# Patient Record
Sex: MALE | Race: WHITE
[De-identification: names, ages, dates, MRNs, and addresses within clinical notes are randomized per-mention and may not be internally consistent; named-entity substitution may affect disease eponyms.]

---

## 2017-09-21 ENCOUNTER — HOSPITAL ENCOUNTER (EMERGENCY)
Dept: HOSPITAL 17 - NEPK | Age: 42
Discharge: HOME | End: 2017-09-21
Payer: MEDICAID

## 2017-09-21 VITALS
TEMPERATURE: 98.2 F | OXYGEN SATURATION: 98 % | HEART RATE: 84 BPM | SYSTOLIC BLOOD PRESSURE: 154 MMHG | RESPIRATION RATE: 15 BRPM | DIASTOLIC BLOOD PRESSURE: 74 MMHG

## 2017-09-21 VITALS — HEIGHT: 69 IN | BODY MASS INDEX: 27.76 KG/M2 | WEIGHT: 187.39 LBS

## 2017-09-21 VITALS — DIASTOLIC BLOOD PRESSURE: 78 MMHG | SYSTOLIC BLOOD PRESSURE: 141 MMHG

## 2017-09-21 DIAGNOSIS — F41.9: ICD-10-CM

## 2017-09-21 DIAGNOSIS — S90.31XA: Primary | ICD-10-CM

## 2017-09-21 DIAGNOSIS — K85.90: ICD-10-CM

## 2017-09-21 DIAGNOSIS — E11.9: ICD-10-CM

## 2017-09-21 DIAGNOSIS — R48.0: ICD-10-CM

## 2017-09-21 DIAGNOSIS — F31.9: ICD-10-CM
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
## 2017-09-21 DIAGNOSIS — J45.909: ICD-10-CM

## 2017-09-21 DIAGNOSIS — K21.9: ICD-10-CM

## 2017-09-21 PROCEDURE — 73630 X-RAY EXAM OF FOOT: CPT

## 2017-09-21 PROCEDURE — 99283 EMERGENCY DEPT VISIT LOW MDM: CPT
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
## 2018-01-14 ENCOUNTER — HOSPITAL ENCOUNTER (INPATIENT)
Dept: HOSPITAL 17 - NEPE | Age: 43
LOS: 3 days | Discharge: HOME | DRG: 638 | End: 2018-01-17
Attending: HOSPITALIST | Admitting: HOSPITALIST
Payer: MEDICAID

## 2018-01-14 VITALS
DIASTOLIC BLOOD PRESSURE: 92 MMHG | OXYGEN SATURATION: 99 % | RESPIRATION RATE: 20 BRPM | HEART RATE: 123 BPM | SYSTOLIC BLOOD PRESSURE: 143 MMHG

## 2018-01-14 VITALS — WEIGHT: 187.39 LBS | BODY MASS INDEX: 27.76 KG/M2 | HEIGHT: 69 IN

## 2018-01-14 VITALS
TEMPERATURE: 98 F | RESPIRATION RATE: 20 BRPM | OXYGEN SATURATION: 98 % | SYSTOLIC BLOOD PRESSURE: 114 MMHG | HEART RATE: 139 BPM | DIASTOLIC BLOOD PRESSURE: 88 MMHG

## 2018-01-14 VITALS — OXYGEN SATURATION: 96 %

## 2018-01-14 DIAGNOSIS — K92.0: ICD-10-CM

## 2018-01-14 DIAGNOSIS — Z88.8: ICD-10-CM

## 2018-01-14 DIAGNOSIS — E86.0: ICD-10-CM

## 2018-01-14 DIAGNOSIS — E10.10: Primary | ICD-10-CM

## 2018-01-14 DIAGNOSIS — Z88.0: ICD-10-CM

## 2018-01-14 DIAGNOSIS — D72.829: ICD-10-CM

## 2018-01-14 DIAGNOSIS — K21.9: ICD-10-CM

## 2018-01-14 DIAGNOSIS — F31.9: ICD-10-CM

## 2018-01-14 DIAGNOSIS — E87.6: ICD-10-CM

## 2018-01-14 DIAGNOSIS — G40.909: ICD-10-CM

## 2018-01-14 DIAGNOSIS — N17.9: ICD-10-CM

## 2018-01-14 DIAGNOSIS — Z79.4: ICD-10-CM

## 2018-01-14 LAB
ALBUMIN SERPL-MCNC: 4.7 GM/DL (ref 3.4–5)
ALP SERPL-CCNC: 138 U/L (ref 45–117)
ALT SERPL-CCNC: 20 U/L (ref 12–78)
AST SERPL-CCNC: 10 U/L (ref 15–37)
BASOPHILS # BLD AUTO: 0.1 TH/MM3 (ref 0–0.2)
BASOPHILS NFR BLD: 0.5 % (ref 0–2)
BILIRUB SERPL-MCNC: 0.7 MG/DL (ref 0.2–1)
BUN SERPL-MCNC: 32 MG/DL (ref 7–18)
CALCIUM SERPL-MCNC: 10 MG/DL (ref 8.5–10.1)
CHLORIDE SERPL-SCNC: 86 MEQ/L (ref 98–107)
CREAT SERPL-MCNC: 2.43 MG/DL (ref 0.6–1.3)
EOSINOPHIL # BLD: 0 TH/MM3 (ref 0–0.4)
EOSINOPHIL NFR BLD: 0 % (ref 0–4)
ERYTHROCYTE [DISTWIDTH] IN BLOOD BY AUTOMATED COUNT: 14.2 % (ref 11.6–17.2)
GFR SERPLBLD BASED ON 1.73 SQ M-ARVRAT: 30 ML/MIN (ref 89–?)
GLUCOSE SERPL-MCNC: 562 MG/DL (ref 74–106)
HCO3 BLD-SCNC: 10.8 MEQ/L (ref 21–32)
HCT VFR BLD CALC: 56.4 % (ref 39–51)
HGB BLD-MCNC: 18.1 GM/DL (ref 13–17)
INR PPP: 1 RATIO
LIPASE: 115 U/L (ref 73–393)
LYMPHOCYTES # BLD AUTO: 1.2 TH/MM3 (ref 1–4.8)
LYMPHOCYTES NFR BLD AUTO: 5.4 % (ref 9–44)
MAGNESIUM SERPL-MCNC: 2.5 MG/DL (ref 1.5–2.5)
MCH RBC QN AUTO: 29.6 PG (ref 27–34)
MCHC RBC AUTO-ENTMCNC: 32 % (ref 32–36)
MCV RBC AUTO: 92.4 FL (ref 80–100)
MONOCYTE #: 0.8 TH/MM3 (ref 0–0.9)
MONOCYTES NFR BLD: 3.7 % (ref 0–8)
NEUTROPHILS # BLD AUTO: 19.8 TH/MM3 (ref 1.8–7.7)
NEUTROPHILS NFR BLD AUTO: 90.4 % (ref 16–70)
PHOSPHATE SERPL-MCNC: 8.9 MG/DL (ref 2.5–4.9)
PLATELET # BLD: 366 TH/MM3 (ref 150–450)
PMV BLD AUTO: 8.4 FL (ref 7–11)
PROT SERPL-MCNC: 9 GM/DL (ref 6.4–8.2)
PROTHROMBIN TIME: 10.3 SEC (ref 9.8–11.6)
RBC # BLD AUTO: 6.11 MIL/MM3 (ref 4.5–5.9)
SODIUM SERPL-SCNC: 128 MEQ/L (ref 136–145)
WBC # BLD AUTO: 21.9 TH/MM3 (ref 4–11)

## 2018-01-14 PROCEDURE — 74176 CT ABD & PELVIS W/O CONTRAST: CPT

## 2018-01-14 PROCEDURE — 82805 BLOOD GASES W/O2 SATURATION: CPT

## 2018-01-14 PROCEDURE — 83735 ASSAY OF MAGNESIUM: CPT

## 2018-01-14 PROCEDURE — 80053 COMPREHEN METABOLIC PANEL: CPT

## 2018-01-14 PROCEDURE — 71045 X-RAY EXAM CHEST 1 VIEW: CPT

## 2018-01-14 PROCEDURE — 87641 MR-STAPH DNA AMP PROBE: CPT

## 2018-01-14 PROCEDURE — 82010 KETONE BODYS QUAN: CPT

## 2018-01-14 PROCEDURE — 83690 ASSAY OF LIPASE: CPT

## 2018-01-14 PROCEDURE — 84100 ASSAY OF PHOSPHORUS: CPT

## 2018-01-14 PROCEDURE — 85730 THROMBOPLASTIN TIME PARTIAL: CPT

## 2018-01-14 PROCEDURE — 85025 COMPLETE CBC W/AUTO DIFF WBC: CPT

## 2018-01-14 PROCEDURE — C9113 INJ PANTOPRAZOLE SODIUM, VIA: HCPCS

## 2018-01-14 PROCEDURE — 81001 URINALYSIS AUTO W/SCOPE: CPT

## 2018-01-14 PROCEDURE — 96375 TX/PRO/DX INJ NEW DRUG ADDON: CPT

## 2018-01-14 PROCEDURE — 96361 HYDRATE IV INFUSION ADD-ON: CPT

## 2018-01-14 PROCEDURE — 82948 REAGENT STRIP/BLOOD GLUCOSE: CPT

## 2018-01-14 PROCEDURE — 83605 ASSAY OF LACTIC ACID: CPT

## 2018-01-14 PROCEDURE — 83036 HEMOGLOBIN GLYCOSYLATED A1C: CPT

## 2018-01-14 PROCEDURE — 83930 ASSAY OF BLOOD OSMOLALITY: CPT

## 2018-01-14 PROCEDURE — 85610 PROTHROMBIN TIME: CPT

## 2018-01-14 PROCEDURE — 80048 BASIC METABOLIC PNL TOTAL CA: CPT

## 2018-01-14 PROCEDURE — 80202 ASSAY OF VANCOMYCIN: CPT

## 2018-01-14 PROCEDURE — 96374 THER/PROPH/DIAG INJ IV PUSH: CPT

## 2018-01-14 RX ADMIN — PHENYTOIN SODIUM SCH MLS/HR: 50 INJECTION INTRAMUSCULAR; INTRAVENOUS at 21:36

## 2018-01-14 RX ADMIN — SODIUM BICARBONATE SCH MLS/HR: 84 INJECTION, SOLUTION INTRAVENOUS at 21:54

## 2018-01-14 NOTE — RADRPT
EXAM DATE/TIME:  01/14/2018 22:31 

 

[facility] COMPARISON:     

CT ABDOMEN & PELVIS W/O CONTRAST, September 21, 2015, 8:04.

 

 

INDICATIONS :     

Abdomen pain.

                  

 

ORAL CONTRAST:      

No oral contrast ingested.

                  

 

RADIATION DOSE:     

9.96 CTDIvol (mGy) 

 

 

MEDICAL HISTORY :     

Seizures. Pancreatitis. Diabetes mellitus type 2.

 

SURGICAL HISTORY :      

None. 

 

ENCOUNTER:      

Initial

 

ACUITY:      

1 day

 

PAIN SCALE:      

8/10

 

LOCATION:       

Bilateral  abdomen

 

TECHNIQUE:     

Volumetric scanning of the abdomen and pelvis was performed.  Using automated exposure control and ad
justment of the mA and/or kV according to patient size, radiation dose was kept as low as reasonably 
achievable to obtain optimal diagnostic quality images.  DICOM format image data is available electro
nically for review and comparison.  

 

FINDINGS:     

 

LOWER LUNGS:     

The visualized lower lungs are clear.

 

LIVER:     

There is mild decreased density to the liver. No focal hepatic lesion is seen.  There is no dilation 
of the biliary tree.  No calcified gallstones.

 

SPLEEN:     

Normal size without lesion.

 

PANCREAS:     

Within normal limits. 

 

KIDNEYS:     

Normal in size and shape.  There is no mass, stone, or hydronephrosis.

 

ADRENAL GLANDS:     

Within normal limits.

 

VASCULAR:     

There is no aortic aneurysm.

 

BOWEL/MESENTERY:     

The stomach, small bowel, and colon demonstrate no acute abnormality.  There is no free intraperitone
al air or fluid. The appendix is normal.

 

ABDOMINAL WALL:     

Within normal limits.

 

RETROPERITONEUM:     

There is no lymphadenopathy.

 

BLADDER:     

No wall thickening or mass.

 

REPRODUCTIVE:     

Within normal limits.

 

INGUINAL:     

There is no lymphadenopathy or hernia.

 

MUSCULOSKELETAL:     

Within normal limits for patient age.

 

CONCLUSION:     

1. No acute abnormality seen.

2. Minimal hepatic steatosis. 

 

 

 

 Luis Antonio Garcia MD on January 14, 2018 at 23:17           

Board Certified Radiologist.

 This report was verified electronically.

## 2018-01-14 NOTE — HHI.HP
__________________________________________________





HPI


Service


[facility]


Primary Care Physician


Feliz Farley D.O.


Admission Diagnosis





DKA/Hematemesis/Dehydration


Diagnoses:  


(1) DKA (diabetic ketoacidoses)


Diagnosis:  Principal





(2) Lactic acidosis


Diagnosis:  Principal





(3) Leukocytosis


Diagnosis:  Principal





(4) Hematemesis


Diagnosis:  Principal





Travel History


International Travel<30 Days:  No


Contact w/Intl Traveler <30 Da:  No


Traveled to Known Affected Are:  No


History of Present Illness


This is a 42-year-old male with a PMH of DM, Anxiety, Depression, Bipolar 

Disorder, Seizure Disorder, Non-Compliance, h/o Pancreatitis and GERD who 

presented to the ER w/ complaints of abdominal pain, nausea/vomiting and 

hematemesis x4-5 days.  Reports generalized abdominal pain, intermittent, 

cramping, severe 8/10, non-radiating, associated w/ nausea/vomiting.  Today, 

noted episode of bloody emesis.  Denies fever, chills, diarrhea or sick 

contacts.  Does not check BS regularly, non-compliant w/ medications.  On 

arrival, /88, , O2 sat 98% on RA, Afebrile.  WBC 21.9.  Creatinine 

2.43, previously 0.91 on 11/6/15.  CO2 10.8.  , Lactic Acid 8.8.  B-

hydroxy 9.37.  ABG w/ pH 7.14.  Started on DKA Protocol.  CT Abd/Pelvis w/ no 

acute findings.





Review of Systems


Except as stated in HPI:  all other systems reviewed are Neg


ROS: 14 point review of systems otherwise negative.





Past Family Social History


Past Medical History


PMH:  DM, Anxiety, Depression, Bipolar Disorder, Seizure Disorder, Non-

Compliance, h/o Pancreatitis and GERD


Past Surgical History


PAST SURGICAL HISTORY:  None


Allergies:  


Coded Allergies:  


     Pork/Porcine Containing Products (Verified  Allergy, Severe, ALLERGIC TO 

PORK INSULIN ONLY, 18)


     insulin aspart (Verified  Allergy, Severe, ITCHING, RASH TO PORK INSULIN 

ONLY, 18)


     insulin aspart protamine human (Verified  Allergy, Severe, ITCHING, RASH 

TO PORK INSULIN ONLY, 18)


     insulin detemir (Verified  Allergy, Severe, ITCHING, RASH TO PORK INSULIN 

ONLY, 18)


     insulin glargine (Verified  Allergy, Severe, ITCHING, RASH TO PORK INSULIN 

ONLY, 18)


     insulin isophane (NPH) (Verified  Allergy, Severe, ITCHING, RASH TO PORK 

INSULIN ONLY, 18)


     insulin lispro (Verified  Allergy, Severe, ITCHING, RASH TO PORK INSULIN 

ONLY, 18)


     insulin regular (Verified  Allergy, Severe, ITCHING, RASH TO PORK INSULIN 

ONLY, 18)


     penicillin G (Verified  Allergy, Severe, HIVES, 18)


Family History


PAST FAMILY HISTORY:  Reviewed, positive for DM.


Social History


PAST SOCIAL HISTORY:  Occasional alcohol.  History of tobacco.  Positive for 

Marijuana.





Physical Exam


Vital Signs





Vital Signs








  Date Time  Temp Pulse Resp B/P (MAP) Pulse Ox O2 Delivery O2 Flow Rate FiO2


 


18 23:02  123 20 143/92 (109) 99 Room Air  


 


18 21:42     96 Room Air  


 


18 19:48 98.0 139 20 114/88 (97) 98 Room Air  








Physical Exam


PE:


GENERAL: Middle-aged male in no acute distress, mildly lethargic, appears weak.


HEENT: PERRLA, EOMI. No scleral icterus or conjunctival pallor. No lid lag or 

facial droop.  


CARDIOVASCULAR: Regular rate and rhythm.  No obvious murmurs to auscultation. 

No chest tenderness to palpation. 


RESPIRATORY: No obvious rhonchi or wheezing. Clear to auscultation. Breath 

sounds equal bilaterally. 


GASTROINTESTINAL: Abdomen soft, non-tender, nondistended. BS normal. 


MUSCULOSKELETAL: Extremities without clubbing, cyanosis, or edema. No obvious 

deformities. 


NEUROLOGICAL: Awake, alert and oriented x4. No focal neurologic deficits. 

Moving both upper and lower extremities spontaneously.


Laboratory





Laboratory Tests








Test


  18


20:59 18


21:02 18


21:04 18


21:41


 


Blood Urea Nitrogen 32    


 


Creatinine 2.43    


 


Random Glucose 562    


 


Total Protein 9.0    


 


Albumin 4.7    


 


Calcium Level 10.0    


 


Phosphorus Level 8.9    


 


Magnesium Level 2.5    


 


Alkaline Phosphatase 138    


 


Aspartate Amino Transf


(AST/SGOT) 10 


  


  


  


 


 


Alanine Aminotransferase


(ALT/SGPT) 20 


  


  


  


 


 


Total Bilirubin 0.7    


 


Sodium Level 128    


 


Potassium Level 4.2    


 


Chloride Level 86    


 


Carbon Dioxide Level 10.8    


 


Anion Gap 31    


 


Estimat Glomerular Filtration


Rate 30 


  


  


  


 


 


Lipase 115    


 


B-Hydroxybutyrate 9.37    


 


Lactic Acid Level  8.8   


 


White Blood Count   21.9  


 


Red Blood Count   6.11  


 


Hemoglobin   18.1  


 


Hematocrit   56.4  


 


Mean Corpuscular Volume   92.4  


 


Mean Corpuscular Hemoglobin   29.6  


 


Mean Corpuscular Hemoglobin


Concent 


  


  32.0 


  


 


 


Red Cell Distribution Width   14.2  


 


Platelet Count   366  


 


Mean Platelet Volume   8.4  


 


Neutrophils (%) (Auto)   90.4  


 


Lymphocytes (%) (Auto)   5.4  


 


Monocytes (%) (Auto)   3.7  


 


Eosinophils (%) (Auto)   0.0  


 


Basophils (%) (Auto)   0.5  


 


Neutrophils # (Auto)   19.8  


 


Lymphocytes # (Auto)   1.2  


 


Monocytes # (Auto)   0.8  


 


Eosinophils # (Auto)   0.0  


 


Basophils # (Auto)   0.1  


 


CBC Comment   DIFF FINAL  


 


Differential Comment     


 


Prothrombin Time   10.3  


 


Prothromb Time International


Ratio 


  


  1.0 


  


 


 


Activated Partial


Thromboplast Time 


  


  LESS THAN 19.0 


  


 


 


Serum Osmolality   352  


 


Blood Gas Puncture Site    IV 


 


Blood Gas Patient Temperature    98.6 


 


Venous Blood pH    7.14 


 


Venous Blood Partial Pressure


CO2 


  


  


  33 


 


 


Venous Blood Partial Pressure


O2 


  


  


  36 


 


 


Venous Blood HCO3    11 


 


Venous Blood Oxygen Saturation    57 


 


Venous Blood Oxygen Content    13.1 


 


Venous Blood Base Excess    -16.5 


 


Oxygen Delivery Device    ROOM AIR 


 


Blood Gas Inspired Oxygen    21 








Result Diagram:  


18








Caprini VTE Risk Assessment


Caprini VTE Risk Assessment:  No/Low Risk (score <= 1)


Caprini Risk Assessment Model











 Point Value = 1          Point Value = 2  Point Value = 3  Point Value = 5


 


Age 41-60


Minor surgery


BMI > 25 kg/m2


Swollen legs


Varicose veins


Pregnancy or postpartum


History of unexplained or recurrent


   spontaneous 


Oral contraceptives or hormone


   replacement


Sepsis (< 1 month)


Serious lung disease, including


   pneumonia (< 1 month)


Abnormal pulmonary function


Acute myocardial infarction


Congestive heart failure (< 1 month)


History of inflammatory bowel disease


Medical patient at bed rest Age 61-74


Arthroscopic surgery


Major open surgery (> 45 min)


Laparoscopic surgery (> 45 min)


Malignancy


Confined to bed (> 72 hours)


Immobilizing plaster cast


Central venous access Age >= 75


History of VTE


Family history of VTE


Factor V Leiden


Prothrombin [id number]


Lupus anticoagulant


Anticardiolipin antibodies


Elevated serum homocysteine


Heparin-induced thrombocytopenia


Other congenital or acquired


   thrombophilia Stroke (< 1 month)


Elective arthroplasty


Hip, pelvis, or leg fracture


Acute spinal cord injury (< 1 month)








Prophylaxis Regimen











   Total Risk


Factor Score Risk Level Prophylaxis Regimen


 


0-1      Low Early ambulation


 


2 Moderate Order ONE of the following:


*Sequential Compression Device (SCD)


*Heparin 5000 units SQ BID


 


3-4 Higher Order ONE of the following medications:


*Heparin 5000 units SQ TID


*Enoxaparin/Lovenox 40 mg SQ daily (WT < 150 kg, CrCl > 30 mL/min)


*Enoxaparin/Lovenox 30 mg SQ daily (WT < 150 kg, CrCl > 10-29 mL/min)


*Enoxaparin/Lovenox 30 mg SQ BID (WT < 150 kg, CrCl > 30 mL/min)


AND/OR


*Sequential Compression Device (SCD)


 


5 or more Highest Order ONE of the following medications:


*Heparin 5000 units SQ TID (Preferred with Epidurals)


*Enoxaparin/Lovenox 40 mg SQ daily (WT < 150 kg, CrCl > 30 mL/min)


*Enoxaparin/Lovenox 30 mg SQ daily (WT < 150 kg, CrCl > 10-29 mL/min)


*Enoxaparin/Lovenox 30 mg SQ BID (WT < 150 kg, CrCl > 30 mL/min)


AND


*Sequential Compression Device (SCD)











Assessment and Plan


Problem List:  


(1) DKA (diabetic ketoacidoses)


ICD Code:  E13.10 - DKA (diabetic ketoacidoses)


Status:  Acute


(2) Lactic acidosis


ICD Code:  E87.2 - Acidosis


(3) Hematemesis


ICD Code:  K92.0 - Hematemesis


Status:  Acute


(4) Leukocytosis


ICD Code:  D72.829 - Leukocytosis


Status:  Acute


Assessment and Plan


A/P:


1.  DKA:  Severe.  , CO2 10.8, AG 31, pH 7.14, B-hydroxy 9.78.  Admit to 

ICU for close monitoring, continue DKA Protocol, check Hgb A1c, transition to 

Sliding Scale and restart home medications when acidosis resolved. 


2.  Lactic Acidosis:  Secondary to above, Lactate 8.8, no evidence of Sepsis.  

Continue aggressive IVF for hydration, repeat Lactic Acid for trend. 


3.  Leukocytosis:  WBC 21.9, Afebrile, likely secondary to acute DKA.  No signs 

of infection.  U/a negative.  Check CXR for possible underlying PNA.  


4.  Hematemesis:  likely secondary to intractable nausea/vomiting from DKA.  CT 

Abd/Pelvis w/ no acute findings, images reviewed by me.  Check repeat Hgb for 

trend.  GI Consult if needed for persistent hematemesis.  Pepcid IV. 


5.  DVT Prophylaxis:  SCD/teds.


6.  Social work for DC planning as needed.


7.  Case discussed at length with ER physician.  Labs/imaging/records reviewed 

by me.





Physician Certification


2 Midnight Certification Type:  Admission for Inpatient Services


Order for Inpatient Services


The services are ordered in accordance with Medicare regulations or non-

Medicare payer requirements, as applicable.  In the case of services not 

specified as inpatient-only, they are appropriately provided as inpatient 

services in accordance with the 2-midnight benchmark.


Estimated LOS (days):  2


 days is the estimated time the patient will need to remain in the hospital, 

assuming treatment plan goals are met and no additional complications.


Post-Hospital Plan:  Not yet determined





Problem Qualifiers





(1) DKA (diabetic ketoacidoses):  


Qualified Codes:  E10.10 - Type 1 diabetes mellitus with ketoacidosis without 

coma


(2) Hematemesis:  


Qualified Codes:  K92.0 - Hematemesis








Farrah Orourke MD 2018 23:11

## 2018-01-14 NOTE — PD
HPI


Chief Complaint:  Abdominal Pain


Time Seen by Provider:  20:42


Travel History


International Travel<30 days:  No


Contact w/Intl Traveler<30days:  No


Traveled to known affect area:  No





History of Present Illness


HPI


42-year-old insulin-dependent diabetic presents emergency Department with 4-5 

day history of nausea and vomiting with reports of hematemesis and abdominal 

pain.  Patient has history of pancreatitis in the past.  Patient is unsure was 

placed sugars have been.  He is very dry states he does not refer the last time 

he urinated.  He complains of dry mouth.  He denies fever or chills.  He states 

no diarrhea.  Patient has multiple allergies please see list.





Vital signs show the patient to be tachycardic.  Bedside blood sugar is 529.





PFSH


Past Medical History


Hx Anticoagulant Therapy:  No


Arthritis:  No


Asthma:  Yes (COMES AND GOES)


Autoimmune Disease:  No


Bipolar Disorder:  Yes


Anxiety:  Yes


Depression:  Yes


Heart Rhythm Problems:  No


Cancer:  No


Cardiovascular Problems:  No


High Cholesterol:  No


Chemotherapy:  No


Chest Pain:  No


Congestive Heart Failure:  No


COPD:  No


Cerebrovascular Accident:  No


Diabetes:  Yes


Patient Takes Glucophage:  No


Diminished Hearing:  No


Endocrine:  Yes (pancreatitis)


Gastrointestinal Disorders:  Yes (HX OF ULCERS)


GERD:  Yes


Genitourinary:  No


Hiatal Hernia:  No


Immune Disorder:  No


Implanted Vascular Access Dvce:  No


Kidney Stones:  No


Musculoskeletal:  No


Neurologic:  Yes


Psychiatric:  Yes (dyslexia)


Reproductive:  No


Respiratory:  No


Migraines:  No


Pancreatitis:  Yes


Radiation Therapy:  No


Renal Failure:  No


Seizures:  Yes


Sickle Cell Disease:  No


Sleep Apnea:  No


Ulcer:  Yes





Past Surgical History


Abdominal Surgery:  No


AICD:  No


Arteriovenous Shunt:  No


Cardiac Surgery:  No


Ear Surgery:  No


Endocrine Surgery:  No


Eye Surgery:  No


Genitourinary Surgery:  No


Gynecologic Surgery:  No


Hysterectomy:  No


Insulin Pump:  No


Joint Replacement:  No


Pacemaker:  No


Thoracic Surgery:  No





Social History


Alcohol Use:  No (RARE)


Tobacco Use:  No


Substance Use:  Yes (WEED ONCE IN A WHILE )





Allergies-Medications


(Allergen,Severity, Reaction):  


Coded Allergies:  


     Pork/Porcine Containing Products (Verified  Allergy, Severe, ALLERGIC TO 

PORK INSULIN ONLY, 1/14/18)


     insulin aspart (Verified  Allergy, Severe, ITCHING, RASH TO PORK INSULIN 

ONLY, 1/14/18)


     insulin aspart protamine human (Verified  Allergy, Severe, ITCHING, RASH 

TO PORK INSULIN ONLY, 1/14/18)


     insulin detemir (Verified  Allergy, Severe, ITCHING, RASH TO PORK INSULIN 

ONLY, 1/14/18)


     insulin glargine (Verified  Allergy, Severe, ITCHING, RASH TO PORK INSULIN 

ONLY, 1/14/18)


     insulin isophane (NPH) (Verified  Allergy, Severe, ITCHING, RASH TO PORK 

INSULIN ONLY, 1/14/18)


     insulin lispro (Verified  Allergy, Severe, ITCHING, RASH TO PORK INSULIN 

ONLY, 1/14/18)


     insulin regular (Verified  Allergy, Severe, ITCHING, RASH TO PORK INSULIN 

ONLY, 1/14/18)


     penicillin G (Verified  Allergy, Severe, HIVES, 1/14/18)


Reported Meds & Prescriptions





Reported Meds & Active Scripts


Active


Reported


Humulin R Inj (Insulin Human Regular) 1,000 Unit/10 Ml Vial 2-12 Units SQ ACHS


     Max dose at bedtime:( )units; sugars < 70 (0)units; sugars 150-199,


     (2)units; sugars 200-249,(4)units; sugars 250-299, (7)units; sugars


     300-349,(10)units; sugars more than 349,(12)units.


Trazodone (Trazodone HCl) 50 Mg Tab 50 Mg PO HS


Seroquel (Quetiapine Fumarate) 50 Mg Tab 50 Mg PO HS


Keppra (Levetiracetam) 750 Mg Tab 750 Mg PO DAILY








Review of Systems


ROS Limitations:  Poor Historian, Other: (obtunded.)


Except as stated in HPI:  all other systems reviewed are Neg


General / Constitutional:  No: Fever, Chills


Eyes:  No: Visual changes


HENT:  Positive: Lightheadedness, No: Headaches, Vertigo, Sore Throat, Rhinitis

, Rhinorrhea, Congestion, Nosebleed, Neck Stiffness, Neck Pain, Gingival 

Bleeding, Dental Difficulties, Ear Discharge, Earache


Cardiovascular:  Positive: Dyspnea on exertion, No: Chest Pain or Discomfort, 

Diaphoresis


Respiratory:  No: Shortness of Breath


Gastrointestinal:  Positive: Nausea, Vomiting, Abdominal Pain, No: Diarrhea


Genitourinary:  Positive: Decreased Urinary Output, No: Dysuria


Musculoskeletal:  No: Pain


Skin:  No Rash


Neurologic:  No: Weakness


Psychiatric:  No: Depression


Endocrine:  No: Polydipsia


Hematologic/Lymphatic:  No: Easy Bruising





Physical Exam


Narrative


GENERAL: Patient is answering questions but is somewhat obtunded, and weak.


SKIN: Warm and dry.  Decreased pallor.  Decreased turgor with tenting.


HEAD: Atraumatic. Normocephalic. 


EYES: Pupils equal and round. No scleral icterus. No injection or drainage. 


ENT: No nasal bleeding or discharge.  Mucous membranes pink and moist.


NECK: Trachea midline. No JVD. 


CARDIOVASCULAR: Regular rate and rhythm.  


RESPIRATORY: No accessory muscle use. Clear to auscultation. Breath sounds 

equal bilaterally. 


GASTROINTESTINAL: Abdomen soft, non-tender, nondistended. Hepatic and splenic 

margins not palpable. 


MUSCULOSKELETAL: Extremities without clubbing, cyanosis, or edema. No obvious 

deformities. 


NEUROLOGICAL: Awake and alert. No obvious cranial nerve deficits.  Motor 

grossly within normal limits. Five out of 5 muscle strength in the arms and 

legs.  Normal speech.


PSYCHIATRIC: Appropriate mood and affect; insight and judgment normal.





Data


Data


Last Documented VS





Vital Signs








  Date Time  Temp Pulse Resp B/P (MAP) Pulse Ox O2 Delivery O2 Flow Rate FiO2


 


1/14/18 23:02  123 20 143/92 (109) 99 Room Air  


 


1/14/18 19:48 98.0       








Orders





 Orders


Complete Blood Count With Diff (1/14/18 20:42)


Comprehensive Metabolic Panel (1/14/18 20:42)


Lipase (1/14/18 20:42)


Lactic Acid (1/14/18 20:42)


Prothrombin Time / Inr (Pt) (1/14/18 20:42)


Act Partial Throm Time (Ptt) (1/14/18 20:42)


Urinalysis - C+S If Indicated (1/14/18 20:42)


Iv Access Insert/Monitor (1/14/18 20:42)


Ecg Monitoring (1/14/18 20:42)


Oximetry (1/14/18 20:42)


NPO (1/14/18 20:42)


Ondansetron Inj (Zofran Inj) (1/14/18 20:45)


Pantoprazole Inj (Protonix Inj) (1/14/18 20:45)


Sodium Chlor 0.9% 1000 Ml Inj (Ns 1000 M (1/14/18 20:42)


Sodium Chloride 0.9% Flush (Ns Flush) (1/14/18 20:45)


Electrocardiogram (1/14/18 20:42)


Al-Mag Hy-Si 40-40-4 Mg/Ml Liq (Mag-Al P (1/14/18 20:45)


Lidocaine 2% Viscous (Xylocaine 2% Visco (1/14/18 20:45)


Osmolality,Serum (1/14/18 20:52)


Blood Gas Venous (Vbg) (1/14/18 20:52)


Sodium Chloride 0.9% Flush (Ns Flush) (1/14/18 21:00)


Insulin Human Regular Inj (Novolin R Inj (1/14/18 21:00)


Blood Glucose (1/14/18 20:52)


Blood Glucose (1/14/18 21:52)


Beta Hydroxybutyrate (Acetone) (1/14/18 20:42)


Magnesium (Mg) (1/14/18 20:42)


Phosphorus (Po4) (1/14/18 20:42)


Cardiac Monitor / Telemetry LUANA.[email or web address] (1/14/18 21:54)


^ Insert Iv (1/14/18 21:54)


Diet Npo (1/15/18 Breakfast)


Sodium Chlor 0.9% 1000 Ml Inj (Ns 1000 M (1/14/18 21:54)


Dext 5%-Nacl 0.9% 1000 Ml Inj (D5w-Ns 10 (1/14/18 21:54)


Insulin Regular (Iv Infusion) (Novolin R (1/14/18 22:00)


Potassium Chlor 40 Meq Premix (Kcl 40 Me (1/14/18 22:00)


Potassium Chlor 40 Meq Premix (Kcl 40 Me (1/14/18 22:00)


Potassium Chlor 20 Meq Premix (Kcl 20 Me (1/14/18 22:00)


Sodium Bicarbonate 8.4% Inj (Sodium Bica (1/14/18 22:00)


Sodium Bicarbonate 8.4% Inj (Sodium Bica (1/14/18 22:00)


Sodium Phosphate Inj (Sodium Phosphate I (1/14/18 22:00)


Hemoglobin (Hgb) A1c (1/14/18 21:54)


Basic Metabolic Panel (Bmp) (1/15/18 02:54)


Basic Metabolic Panel (Bmp) (1/15/18 08:54)


Basic Metabolic Panel (Bmp) (1/15/18 14:54)


Basic Metabolic Panel (Bmp) (1/15/18 20:54)


Magnesium (Mg) (1/15/18 02:54)


Magnesium (Mg) (1/15/18 08:54)


Magnesium (Mg) (1/15/18 14:54)


Magnesium (Mg) (1/15/18 20:54)


Phosphorus (Po4) (1/15/18 02:54)


Phosphorus (Po4) (1/15/18 08:54)


Phosphorus (Po4) (1/15/18 14:54)


Phosphorus (Po4) (1/15/18 20:54)


Beta Hydroxybutyrate (Acetone) (1/15/18 08:54)


Beta Hydroxybutyrate (Acetone) (1/15/18 20:54)


Ct Abd/Pel W/O Iv Contrast (1/14/18 22:36)


Admit Order (Ed Use Only) (1/14/18 23:07)





Labs





Laboratory Tests








Test


  1/14/18


20:59 1/14/18


21:02 1/14/18


21:04 1/14/18


21:41


 


Blood Urea Nitrogen 32 MG/DL    


 


Creatinine 2.43 MG/DL    


 


Random Glucose 562 MG/DL    


 


Total Protein 9.0 GM/DL    


 


Albumin 4.7 GM/DL    


 


Calcium Level 10.0 MG/DL    


 


Phosphorus Level 8.9 MG/DL    


 


Magnesium Level 2.5 MG/DL    


 


Alkaline Phosphatase 138 U/L    


 


Aspartate Amino Transf


(AST/SGOT) 10 U/L 


  


  


  


 


 


Alanine Aminotransferase


(ALT/SGPT) 20 U/L 


  


  


  


 


 


Total Bilirubin 0.7 MG/DL    


 


Sodium Level 128 MEQ/L    


 


Potassium Level 4.2 MEQ/L    


 


Chloride Level 86 MEQ/L    


 


Carbon Dioxide Level 10.8 MEQ/L    


 


Anion Gap 31 MEQ/L    


 


Estimat Glomerular Filtration


Rate 30 ML/MIN 


  


  


  


 


 


Lipase 115 U/L    


 


B-Hydroxybutyrate 9.37 MMOL/L    


 


Lactic Acid Level  8.8 mmol/L   


 


White Blood Count   21.9 TH/MM3  


 


Red Blood Count   6.11 MIL/MM3  


 


Hemoglobin   18.1 GM/DL  


 


Hematocrit   56.4 %  


 


Mean Corpuscular Volume   92.4 FL  


 


Mean Corpuscular Hemoglobin   29.6 PG  


 


Mean Corpuscular Hemoglobin


Concent 


  


  32.0 % 


  


 


 


Red Cell Distribution Width   14.2 %  


 


Platelet Count   366 TH/MM3  


 


Mean Platelet Volume   8.4 FL  


 


Neutrophils (%) (Auto)   90.4 %  


 


Lymphocytes (%) (Auto)   5.4 %  


 


Monocytes (%) (Auto)   3.7 %  


 


Eosinophils (%) (Auto)   0.0 %  


 


Basophils (%) (Auto)   0.5 %  


 


Neutrophils # (Auto)   19.8 TH/MM3  


 


Lymphocytes # (Auto)   1.2 TH/MM3  


 


Monocytes # (Auto)   0.8 TH/MM3  


 


Eosinophils # (Auto)   0.0 TH/MM3  


 


Basophils # (Auto)   0.1 TH/MM3  


 


CBC Comment   DIFF FINAL  


 


Differential Comment     


 


Prothrombin Time   10.3 SEC  


 


Prothromb Time International


Ratio 


  


  1.0 RATIO 


  


 


 


Activated Partial


Thromboplast Time 


  


  LESS THAN 19.0


SEC 


 


 


Serum Osmolality   352 MOSM/KG  


 


Blood Gas Puncture Site    IV 


 


Blood Gas Patient Temperature    98.6 


 


Venous Blood pH    7.14 


 


Venous Blood Partial Pressure


CO2 


  


  


  33 mmHg 


 


 


Venous Blood Partial Pressure


O2 


  


  


  36 mmHg 


 


 


Venous Blood HCO3    11 mmol/L 


 


Venous Blood Oxygen Saturation    57 % 


 


Venous Blood Oxygen Content    13.1 Vol % 


 


Venous Blood Base Excess    -16.5 mmol/L 


 


Oxygen Delivery Device    ROOM AIR 


 


Blood Gas Inspired Oxygen    21 % 











[facility]


Medical Decision Making


Medical Screen Exam Complete:  Yes


Emergency Medical Condition:  Yes


Medical Record Reviewed:  Yes


Differential Diagnosis


Hyperglycemia.  DKA.  Pancreatitis.  Nausea vomiting.  Hematemesis.  

Dehydration.  Electrolyte imbalance.


Narrative Course


Patient appears moderately distressed with possible DKA.


IV access is obtained.  Labs ordered including CBC, CMP, lactic acid, lipase, 

coagulation studies, urinalysis, venous blood gas, phosphorus, magnesium, beta 

hydroxybutyrate.  Type and screen is ordered as well.


Patient is given 10 units insulin IV.


2000 mL normal saline bolus is ordered.


Patient is a blood sugar checks every hour 2.


CT of the abdomen with IV contrast is ordered pending creatinine.


CBC is remarkable for leukocytosis of 21.9.  Hemoglobin is 18.1.  Hematocrit is 

56.4.


Venous blood gas shows DVT pH of 7.14, TBG HC03 is 11, and the GB base excess 

is -16.5 L.  Patient is in DKA.


Those results were discussed with Dr. Arguello who recommended starting the 

patient was DKA insulin drip with protocols.


Serum osmolality is 352.


Lactic acid is 8.8.


CMP significant sodium of 128, chloride 86, carbon dioxide is 10.8, anion gap 

was 31.  BUN is 32, creatinine is 2.43, is estimated at 30.


Glucose from the lab is 562.


Phosphorus 8.9.  Calcium is 10.  Alkaline phosphatase is 138, total protein is 

9.0, lipase is 1:15.





Patient will be admitted to the ICU.


Call placed to the hospitalist.





Diagnosis





 Primary Impression:  


 DKA (diabetic ketoacidoses)


 Qualified Codes:  E10.10 - Type 1 diabetes mellitus with ketoacidosis without 

coma


 Additional Impression:  


 Hematemesis


 Qualified Codes:  K92.0 - Hematemesis





Admitting Information


Admitting Physician Requests:  Admit


Condition:  Stable











Chet Meade Jan 14, 2018 20:46

## 2018-01-14 NOTE — PD
Data


Data


Last Documented VS





Vital Signs








  Date Time  Temp Pulse Resp B/P (MAP) Pulse Ox O2 Delivery O2 Flow Rate FiO2


 


1/14/18 23:02  123 20 143/92 (109) 99 Room Air  


 


1/14/18 19:48 98.0       








Orders





 Orders


Complete Blood Count With Diff (1/14/18 20:42)


Comprehensive Metabolic Panel (1/14/18 20:42)


Lipase (1/14/18 20:42)


Lactic Acid (1/14/18 20:42)


Prothrombin Time / Inr (Pt) (1/14/18 20:42)


Act Partial Throm Time (Ptt) (1/14/18 20:42)


Urinalysis - C+S If Indicated (1/14/18 20:42)


Iv Access Insert/Monitor (1/14/18 20:42)


Ecg Monitoring (1/14/18 20:42)


Oximetry (1/14/18 20:42)


NPO (1/14/18 20:42)


Ondansetron Inj (Zofran Inj) (1/14/18 20:45)


Pantoprazole Inj (Protonix Inj) (1/14/18 20:45)


Sodium Chlor 0.9% 1000 Ml Inj (Ns 1000 M (1/14/18 20:42)


Sodium Chloride 0.9% Flush (Ns Flush) (1/14/18 20:45)


Al-Mag Hy-Si 40-40-4 Mg/Ml Liq (Mag-Al P (1/14/18 20:45)


Lidocaine 2% Viscous (Xylocaine 2% Visco (1/14/18 20:45)


Osmolality,Serum (1/14/18 20:52)


Blood Gas Venous (Vbg) (1/14/18 20:52)


Sodium Chloride 0.9% Flush (Ns Flush) (1/14/18 21:00)


Insulin Human Regular Inj (Novolin R Inj (1/14/18 21:00)


Blood Glucose (1/14/18 20:52)


Blood Glucose (1/14/18 21:52)


Beta Hydroxybutyrate (Acetone) (1/14/18 20:42)


Magnesium (Mg) (1/14/18 20:42)


Phosphorus (Po4) (1/14/18 20:42)


Cardiac Monitor / Telemetry LUANA.Q8H (1/14/18 21:54)


^ Insert Iv (1/14/18 21:54)


Diet Npo (1/15/18 Breakfast)


Sodium Chlor 0.9% 1000 Ml Inj (Ns 1000 M (1/14/18 21:54)


Dext 5%-Nacl 0.9% 1000 Ml Inj (D5w-Ns 10 (1/14/18 21:54)


Insulin Regular (Iv Infusion) (Novolin R (1/14/18 22:00)


Potassium Chlor 40 Meq Premix (Kcl 40 Me (1/14/18 22:00)


Potassium Chlor 40 Meq Premix (Kcl 40 Me (1/14/18 22:00)


Potassium Chlor 20 Meq Premix (Kcl 20 Me (1/14/18 22:00)


Sodium Bicarbonate 8.4% Inj (Sodium Bica (1/14/18 22:00)


Sodium Bicarbonate 8.4% Inj (Sodium Bica (1/14/18 22:00)


Sodium Phosphate Inj (Sodium Phosphate I (1/14/18 22:00)


Hemoglobin (Hgb) A1c (1/14/18 21:54)


Basic Metabolic Panel (Bmp) (1/15/18 02:54)


Basic Metabolic Panel (Bmp) (1/15/18 08:54)


Magnesium (Mg) (1/15/18 02:54)


Magnesium (Mg) (1/15/18 08:54)


Phosphorus (Po4) (1/15/18 02:54)


Phosphorus (Po4) (1/15/18 08:54)


Beta Hydroxybutyrate (Acetone) (1/15/18 08:54)


Ct Abd/Pel W/O Iv Contrast (1/14/18 22:36)


Admit Order (Ed Use Only) (1/14/18 23:07)


Random Vancomycin (1/15/18 02:54)





Labs





Laboratory Tests








Test


  1/14/18


20:59 1/14/18


21:02 1/14/18


21:04 1/14/18


21:41


 


Blood Urea Nitrogen 32 MG/DL    


 


Creatinine 2.43 MG/DL    


 


Random Glucose 562 MG/DL    


 


Total Protein 9.0 GM/DL    


 


Albumin 4.7 GM/DL    


 


Calcium Level 10.0 MG/DL    


 


Phosphorus Level 8.9 MG/DL    


 


Magnesium Level 2.5 MG/DL    


 


Alkaline Phosphatase 138 U/L    


 


Aspartate Amino Transf


(AST/SGOT) 10 U/L 


  


  


  


 


 


Alanine Aminotransferase


(ALT/SGPT) 20 U/L 


  


  


  


 


 


Total Bilirubin 0.7 MG/DL    


 


Sodium Level 128 MEQ/L    


 


Potassium Level 4.2 MEQ/L    


 


Chloride Level 86 MEQ/L    


 


Carbon Dioxide Level 10.8 MEQ/L    


 


Anion Gap 31 MEQ/L    


 


Estimat Glomerular Filtration


Rate 30 ML/MIN 


  


  


  


 


 


Lipase 115 U/L    


 


B-Hydroxybutyrate 9.37 MMOL/L    


 


Lactic Acid Level  8.8 mmol/L   


 


White Blood Count   21.9 TH/MM3  


 


Red Blood Count   6.11 MIL/MM3  


 


Hemoglobin   18.1 GM/DL  


 


Hematocrit   56.4 %  


 


Mean Corpuscular Volume   92.4 FL  


 


Mean Corpuscular Hemoglobin   29.6 PG  


 


Mean Corpuscular Hemoglobin


Concent 


  


  32.0 % 


  


 


 


Red Cell Distribution Width   14.2 %  


 


Platelet Count   366 TH/MM3  


 


Mean Platelet Volume   8.4 FL  


 


Neutrophils (%) (Auto)   90.4 %  


 


Lymphocytes (%) (Auto)   5.4 %  


 


Monocytes (%) (Auto)   3.7 %  


 


Eosinophils (%) (Auto)   0.0 %  


 


Basophils (%) (Auto)   0.5 %  


 


Neutrophils # (Auto)   19.8 TH/MM3  


 


Lymphocytes # (Auto)   1.2 TH/MM3  


 


Monocytes # (Auto)   0.8 TH/MM3  


 


Eosinophils # (Auto)   0.0 TH/MM3  


 


Basophils # (Auto)   0.1 TH/MM3  


 


CBC Comment   DIFF FINAL  


 


Differential Comment     


 


Prothrombin Time   10.3 SEC  


 


Prothromb Time International


Ratio 


  


  1.0 RATIO 


  


 


 


Activated Partial


Thromboplast Time 


  


  LESS THAN 19.0


SEC 


 


 


Serum Osmolality   352 MOSM/KG  


 


Blood Gas Puncture Site    IV 


 


Blood Gas Patient Temperature    98.6 


 


Venous Blood pH    7.14 


 


Venous Blood Partial Pressure


CO2 


  


  


  33 mmHg 


 


 


Venous Blood Partial Pressure


O2 


  


  


  36 mmHg 


 


 


Venous Blood HCO3    11 mmol/L 


 


Venous Blood Oxygen Saturation    57 % 


 


Venous Blood Oxygen Content    13.1 Vol % 


 


Venous Blood Base Excess    -16.5 mmol/L 


 


Oxygen Delivery Device    ROOM AIR 


 


Blood Gas Inspired Oxygen    21 % 











[facility]


Medical Record Reviewed:  Yes


Supervised Visit with JOSELINE:  Yes


Interpretation(s)





Last Impressions








Abdomen/Pelvis CT 1/14/18 2236 Signed





Impressions: 





 Service Date/Time:  Sunday, January 14, 2018 22:31 - CONCLUSION:  1. No acute 





 abnormality seen. 2. Minimal hepatic steatosis.      Luis Antonio Garcia MD 


 


Chest X-Ray 1/14/18 0000 Signed





Impressions: 





 Service Date/Time:  Sunday, January 14, 2018 23:32 - CONCLUSION: No acute 





 disease.       Luis Antonio Garcia MD 








Narrative Course


I, Dr. Arguello, have reviewed the advance practice practitioner's documentation 

and am in agreement, met with the patient face to face, made the diagnosis, and 

the medical decision making was done by me.  The patient was initially 

evaluated by Chet, the physician assistance.  Please see their complete 

history and physical.





*My assessment and Findings: The patient presents with a history of abdominal 

pain with nausea vomiting and coffee-ground emesis that he reports began 4-5 

days ago.  The patient has a history of diabetes.  The patient has a history of 

pancreatitis.  The patient is unsure what his recent blood sugar has been.  He 

reports having a dry mouth with urinary frequency and polydipsia.





During the course of the patients emergency department visit, the patients 

history, examination, and differential diagnosis were reviewed with the 

patient. The patient was placed on a cardiac monitor with oximetry and frequent 

blood pressure monitoring. The patient had  IV access obtained and blood work 

sent for analysis. 





The patient was initially provided 2 L of normal saline IV fluids as the 

patient appeared to be clinically dehydrated.  The patient also appeared to be 

tachypneic consistent with DKA with an elevated blood sugar.  A VBG was ordered 

which confirmed acidosis.  The patient was given an insulin bolus and started 

on an insulin drip.





The patients laboratory studies were reviewed and remarkable for white count of 

21.9, hemoglobin 18.1, platelets 366 with neutrophils 90.4, CMP is remarkable 

for a sodium of 128, CO2 10.8, BUN 32, creatinine 2.43, anion gap 32, glucose 

562, lactic acid 8.8, lipase 115.  PT 10.3, PTT less than 19, beta 

hydroxybutyrate is 9.37, urinalysis shows 1000 glucose 150 ketones





Radiology studies were reviewed and remarkable for chest x-ray that shows no 

acute cardiopulmonary disease, CT scan of the abdomen and pelvis shows no acute 

abnormality, minimal hepatic steatosis.





The patients results were discussed with the patient, including the plan of 

care. I explained that further testing and/ or monitoring is indicated based on 

the patients history, examination, and/ or laboratory findings. Therefore, I 

recommended admission for additional evaluation. The patient expressed 

understanding and was agreeable with this plan. The patient was admitted to the 

hospital in guarded condition and [facility]


Diagnosis





 Primary Impression:  


 DKA (diabetic ketoacidoses)


 Qualified Codes:  E10.10 - Type 1 diabetes mellitus with ketoacidosis without 

coma


 Additional Impression:  


 Dehydration





Admitting Information


Admitting Physician Requests:  Admit











Janeth Arguello MD Jan 14, 2018 20:53

## 2018-01-15 VITALS — HEART RATE: 118 BPM

## 2018-01-15 VITALS
RESPIRATION RATE: 20 BRPM | SYSTOLIC BLOOD PRESSURE: 124 MMHG | DIASTOLIC BLOOD PRESSURE: 78 MMHG | HEART RATE: 92 BPM | OXYGEN SATURATION: 96 % | TEMPERATURE: 98.3 F

## 2018-01-15 VITALS
RESPIRATION RATE: 19 BRPM | TEMPERATURE: 97.5 F | SYSTOLIC BLOOD PRESSURE: 123 MMHG | HEART RATE: 100 BPM | OXYGEN SATURATION: 97 % | DIASTOLIC BLOOD PRESSURE: 67 MMHG

## 2018-01-15 VITALS
HEART RATE: 101 BPM | DIASTOLIC BLOOD PRESSURE: 73 MMHG | OXYGEN SATURATION: 97 % | SYSTOLIC BLOOD PRESSURE: 119 MMHG | RESPIRATION RATE: 19 BRPM

## 2018-01-15 VITALS
SYSTOLIC BLOOD PRESSURE: 121 MMHG | TEMPERATURE: 98.4 F | DIASTOLIC BLOOD PRESSURE: 76 MMHG | OXYGEN SATURATION: 99 % | HEART RATE: 112 BPM | RESPIRATION RATE: 33 BRPM

## 2018-01-15 VITALS
DIASTOLIC BLOOD PRESSURE: 66 MMHG | HEART RATE: 96 BPM | OXYGEN SATURATION: 99 % | SYSTOLIC BLOOD PRESSURE: 124 MMHG | RESPIRATION RATE: 26 BRPM

## 2018-01-15 VITALS
HEART RATE: 116 BPM | SYSTOLIC BLOOD PRESSURE: 138 MMHG | TEMPERATURE: 98.5 F | RESPIRATION RATE: 20 BRPM | OXYGEN SATURATION: 97 % | DIASTOLIC BLOOD PRESSURE: 91 MMHG

## 2018-01-15 VITALS — HEART RATE: 109 BPM

## 2018-01-15 VITALS
HEART RATE: 92 BPM | DIASTOLIC BLOOD PRESSURE: 72 MMHG | RESPIRATION RATE: 23 BRPM | OXYGEN SATURATION: 98 % | SYSTOLIC BLOOD PRESSURE: 122 MMHG

## 2018-01-15 VITALS
SYSTOLIC BLOOD PRESSURE: 146 MMHG | DIASTOLIC BLOOD PRESSURE: 87 MMHG | OXYGEN SATURATION: 97 % | HEART RATE: 96 BPM | TEMPERATURE: 97.5 F | RESPIRATION RATE: 26 BRPM

## 2018-01-15 VITALS
SYSTOLIC BLOOD PRESSURE: 114 MMHG | RESPIRATION RATE: 19 BRPM | DIASTOLIC BLOOD PRESSURE: 66 MMHG | HEART RATE: 100 BPM | OXYGEN SATURATION: 99 %

## 2018-01-15 VITALS
DIASTOLIC BLOOD PRESSURE: 72 MMHG | HEART RATE: 95 BPM | OXYGEN SATURATION: 97 % | SYSTOLIC BLOOD PRESSURE: 124 MMHG | RESPIRATION RATE: 18 BRPM

## 2018-01-15 VITALS — HEART RATE: 100 BPM

## 2018-01-15 VITALS
DIASTOLIC BLOOD PRESSURE: 79 MMHG | OXYGEN SATURATION: 98 % | SYSTOLIC BLOOD PRESSURE: 110 MMHG | RESPIRATION RATE: 19 BRPM | HEART RATE: 91 BPM

## 2018-01-15 VITALS
RESPIRATION RATE: 21 BRPM | SYSTOLIC BLOOD PRESSURE: 120 MMHG | DIASTOLIC BLOOD PRESSURE: 67 MMHG | TEMPERATURE: 97.6 F | HEART RATE: 100 BPM | OXYGEN SATURATION: 96 %

## 2018-01-15 VITALS
HEART RATE: 95 BPM | DIASTOLIC BLOOD PRESSURE: 85 MMHG | RESPIRATION RATE: 23 BRPM | OXYGEN SATURATION: 98 % | SYSTOLIC BLOOD PRESSURE: 148 MMHG

## 2018-01-15 LAB
BUN SERPL-MCNC: 21 MG/DL (ref 7–18)
BUN SERPL-MCNC: 25 MG/DL (ref 7–18)
CALCIUM SERPL-MCNC: 8.4 MG/DL (ref 8.5–10.1)
CALCIUM SERPL-MCNC: 8.5 MG/DL (ref 8.5–10.1)
CHLORIDE SERPL-SCNC: 108 MEQ/L (ref 98–107)
CHLORIDE SERPL-SCNC: 110 MEQ/L (ref 98–107)
COLOR UR: (no result)
CREAT SERPL-MCNC: 1.27 MG/DL (ref 0.6–1.3)
CREAT SERPL-MCNC: 1.44 MG/DL (ref 0.6–1.3)
GFR SERPLBLD BASED ON 1.73 SQ M-ARVRAT: 54 ML/MIN (ref 89–?)
GFR SERPLBLD BASED ON 1.73 SQ M-ARVRAT: 63 ML/MIN (ref 89–?)
GLUCOSE SERPL-MCNC: 169 MG/DL (ref 74–106)
GLUCOSE SERPL-MCNC: 193 MG/DL (ref 74–106)
GLUCOSE UR STRIP-MCNC: 1000 MG/DL
HBA1C MFR BLD: 11.2 % (ref 4.3–6)
HCO3 BLD-SCNC: 21.2 MEQ/L (ref 21–32)
HCO3 BLD-SCNC: 23.4 MEQ/L (ref 21–32)
HGB UR QL STRIP: (no result)
HYALINE CASTS #/AREA URNS LPF: 7 /LPF
KETONES UR STRIP-MCNC: 150 MG/DL
MAGNESIUM SERPL-MCNC: 2.3 MG/DL (ref 1.5–2.5)
MAGNESIUM SERPL-MCNC: 2.5 MG/DL (ref 1.5–2.5)
MUCOUS THREADS #/AREA URNS LPF: (no result) /LPF
NITRITE UR QL STRIP: (no result)
PHOSPHATE SERPL-MCNC: 0.8 MG/DL (ref 2.5–4.9)
PHOSPHATE SERPL-MCNC: 0.8 MG/DL (ref 2.5–4.9)
RANDOM VANCOMYCIN: (no result) COMMENT
SODIUM SERPL-SCNC: 140 MEQ/L (ref 136–145)
SODIUM SERPL-SCNC: 141 MEQ/L (ref 136–145)
SP GR UR STRIP: 1.02 (ref 1–1.03)
SQUAMOUS #/AREA URNS HPF: <1 /HPF (ref 0–5)
URINE LEUKOCYTE ESTERASE: (no result)

## 2018-01-15 RX ADMIN — ACYCLOVIR SCH UNITS: 800 TABLET ORAL at 21:15

## 2018-01-15 RX ADMIN — SODIUM BICARBONATE SCH MLS/HR: 84 INJECTION, SOLUTION INTRAVENOUS at 03:51

## 2018-01-15 RX ADMIN — CHLORHEXIDINE GLUCONATE SCH PACK: 500 CLOTH TOPICAL at 04:00

## 2018-01-15 RX ADMIN — MORPHINE SULFATE PRN MG: 2 INJECTION, SOLUTION INTRAMUSCULAR; INTRAVENOUS at 18:13

## 2018-01-15 RX ADMIN — POTASSIUM CHLORIDE PRN MLS/HR: 200 INJECTION, SOLUTION INTRAVENOUS at 05:09

## 2018-01-15 RX ADMIN — SODIUM BICARBONATE SCH MLS/HR: 84 INJECTION, SOLUTION INTRAVENOUS at 02:54

## 2018-01-15 RX ADMIN — Medication SCH ML: at 10:23

## 2018-01-15 RX ADMIN — INSULIN ASPART SCH: 100 INJECTION, SOLUTION INTRAVENOUS; SUBCUTANEOUS at 21:15

## 2018-01-15 RX ADMIN — INSULIN ASPART SCH: 100 INJECTION, SOLUTION INTRAVENOUS; SUBCUTANEOUS at 17:00

## 2018-01-15 RX ADMIN — ONDANSETRON PRN MG: 2 INJECTION, SOLUTION INTRAMUSCULAR; INTRAVENOUS at 00:29

## 2018-01-15 RX ADMIN — STANDARDIZED SENNA CONCENTRATE AND DOCUSATE SODIUM SCH TAB: 8.6; 5 TABLET, FILM COATED ORAL at 21:00

## 2018-01-15 RX ADMIN — PHENYTOIN SODIUM SCH MLS/HR: 50 INJECTION INTRAMUSCULAR; INTRAVENOUS at 05:10

## 2018-01-15 RX ADMIN — PHENYTOIN SODIUM SCH MLS/HR: 50 INJECTION INTRAMUSCULAR; INTRAVENOUS at 13:54

## 2018-01-15 RX ADMIN — INSULIN ASPART SCH UNITS: 100 INJECTION, SOLUTION INTRAVENOUS; SUBCUTANEOUS at 17:00

## 2018-01-15 RX ADMIN — Medication SCH ML: at 21:14

## 2018-01-15 RX ADMIN — PHENYTOIN SODIUM SCH MLS/HR: 50 INJECTION INTRAMUSCULAR; INTRAVENOUS at 00:43

## 2018-01-15 RX ADMIN — PHENYTOIN SODIUM SCH MLS/HR: 50 INJECTION INTRAMUSCULAR; INTRAVENOUS at 00:52

## 2018-01-15 RX ADMIN — PHENYTOIN SODIUM SCH MLS/HR: 50 INJECTION INTRAMUSCULAR; INTRAVENOUS at 09:54

## 2018-01-15 RX ADMIN — STANDARDIZED SENNA CONCENTRATE AND DOCUSATE SODIUM SCH TAB: 8.6; 5 TABLET, FILM COATED ORAL at 09:00

## 2018-01-15 RX ADMIN — POTASSIUM CHLORIDE PRN MLS/HR: 200 INJECTION, SOLUTION INTRAVENOUS at 02:33

## 2018-01-15 RX ADMIN — ACYCLOVIR SCH UNITS: 800 TABLET ORAL at 10:23

## 2018-01-15 RX ADMIN — MORPHINE SULFATE PRN MG: 2 INJECTION, SOLUTION INTRAMUSCULAR; INTRAVENOUS at 04:12

## 2018-01-15 RX ADMIN — SODIUM BICARBONATE SCH MLS/HR: 84 INJECTION, SOLUTION INTRAVENOUS at 12:54

## 2018-01-15 RX ADMIN — ONDANSETRON PRN MG: 2 INJECTION, SOLUTION INTRAMUSCULAR; INTRAVENOUS at 18:13

## 2018-01-15 RX ADMIN — PHENYTOIN SODIUM SCH MLS/HR: 50 INJECTION INTRAMUSCULAR; INTRAVENOUS at 00:51

## 2018-01-15 NOTE — HHI.PR
Subjective


Remarks


Follow-up for abdominal pain, hematemesis, nausea vomiting, DKA.  Patient is 

currently doing well.  He is somewhat drowsy but answers questions 

appropriately.  He is tolerating current diet and would like to advance diet.  

He complains of some abdominal pain.  No fever or chills.





Objective


Vitals





Vital Signs








  Date Time  Temp Pulse Resp B/P (MAP) Pulse Ox O2 Delivery O2 Flow Rate FiO2


 


1/15/18 14:00  96      


 


1/15/18 13:00  92 23 122/72 (89) 98   


 


1/15/18 12:00 97.5 100 19 123/67 (85) 97   


 


1/15/18 12:00  100      


 


1/15/18 11:00  100 19 114/66 (82) 99   


 


1/15/18 10:00  91      


 


1/15/18 10:00  91 19 110/79 (89) 98   


 


1/15/18 09:00  95 18 124/72 (89) 97   


 


1/15/18 08:00  100      


 


1/15/18 08:00 97.6 100 21 120/67 (84) 96   


 


1/15/18 07:00  101 19 119/73 (88) 97   


 


1/15/18 06:00  109      


 


1/15/18 04:00 98.4 112 33 121/76 (91) 99   


 


1/15/18 04:00  112      


 


1/15/18 02:00  118      


 


1/15/18 01:36 98.5 116 20 138/91 (107) 97   


 


1/14/18 23:02  123 20 143/92 (109) 99 Room Air  


 


1/14/18 21:42     96 Room Air  


 


1/14/18 19:48 98.0 139 20 114/88 (97) 98 Room Air  














I/O      


 


 1/14/18 1/14/18 1/14/18 1/15/18 1/15/18 1/15/18





 07:00 15:00 23:00 07:00 15:00 23:00


 


Intake Total    1100 ml 1139.7 ml 


 


Output Total    630 ml  


 


Balance    470 ml 1139.7 ml 


 


      


 


Intake IV Total    1100 ml 1139.7 ml 


 


Output Urine Total    630 ml  


 


# Bowel Movements    0  








Result Diagram:  


1/14/18 2104                                                                   

             1/15/18 0930





Imaging





Last Impressions








Abdomen/Pelvis CT 1/14/18 2236 Signed





Impressions: 





 Service Date/Time:  Sunday, January 14, 2018 22:31 - CONCLUSION:  1. No acute 





 abnormality seen. 2. Minimal hepatic steatosis.      Luis Antonio Garcia MD 


 


Chest X-Ray 1/14/18 0000 Signed





Impressions: 





 Service Date/Time:  Sunday, January 14, 2018 23:32 - CONCLUSION: No acute 





 disease.       Luis Antonio Garcia MD 








Objective Remarks


GENERAL: Alert, oriented 3, NAD.  Somewhat lethargic.


SKIN: Warm and dry.


HEAD: Normocephalic.


EYES: No scleral icterus. No injection or drainage. 


NECK: Supple, trachea midline. No JVD or lymphadenopathy.


CARDIOVASCULAR: Regular rate and rhythm without murmurs, gallops, or rubs. 


RESPIRATORY: Breath sounds equal bilaterally. No accessory muscle use.


GASTROINTESTINAL: Tenderness on palpation over epigastric and left lower 

quadrant.  No distention.


MUSCULOSKELETAL: No cyanosis, or edema. 


BACK: Nontender without obvious deformity. No CVA tenderness.





Procedures


None





A/P


Problem List:  


(1) DKA (diabetic ketoacidoses)


ICD Code:  E13.10 - DKA (diabetic ketoacidoses)


Status:  Acute


(2) Lactic acidosis


ICD Code:  E87.2 - Acidosis


(3) Hematemesis


ICD Code:  K92.0 - Hematemesis


Status:  Acute


(4) Leukocytosis


ICD Code:  D72.829 - Leukocytosis


Status:  Acute


Assessment and Plan


This is a 42-year-old male with a PMH of DM, Anxiety, Depression, Bipolar 

Disorder, Seizure Disorder, Non-Compliance, h/o Pancreatitis and GERD who 

presented to the ER w/ complaints of abdominal pain, nausea/vomiting and 

hematemesis x4-5 days. On arrival, /88, , O2 sat 98% on RA, 

Afebrile.  WBC 21.9.  Creatinine 2.43, previously 0.91 on 11/6/15.  CO2 10.8.  

, Lactic Acid 8.8.  B-hydroxy 9.37.  ABG w/ pH 7.14.  Started on DKA 

Protocol.  CT Abd/Pelvis w/ no acute findings.





- Diabetic ketoacidosis


- Diabetes mellitus


   - We'll stop IV insulin drip and start patient on subcutaneous insulin.  

Patient is tolerating diet well.  We'll advance diet


   - Continue Levemir 10 units to 12 hours.  We'll try to switch to daily at 

bedtime only.


   - Continue sliding scale insulin and add pre-meal insulin 4 units aspart 3 

times a day before meals with holding parameters.


   - Hemoglobin A1c 11.2





- History of seizure disorder - continue Keppra 750 daily





- Transfer to the floor.  Out of bed with assistance.





Probable discharge on 1/16/2018.





Full code.  SCDs.





Discussed with RN.





Problem Qualifiers





(1) DKA (diabetic ketoacidoses):  


Qualified Codes:  E10.10 - Type 1 diabetes mellitus with ketoacidosis without 

coma


(2) Hematemesis:  


Qualified Codes:  K92.0 - Hematemesis








Ancelmo Paez DO Uri 15, 2018 2:35 pm

## 2018-01-15 NOTE — RADRPT
EXAM DATE/TIME:  01/14/2018 23:32 

 

[facility] COMPARISON:     

No previous studies available for comparison.

 

                     

INDICATIONS :     

Cough.

                     

 

MEDICAL HISTORY :     

Pancreatitis.  Diabetes mellitus type II.     Seizures   

 

SURGICAL HISTORY :     

None.   

 

ENCOUNTER:     

Initial                                        

 

ACUITY:     

1 day      

 

PAIN SCORE:     

0/10

 

LOCATION:     

Bilateral chest 

 

FINDINGS:     

A single view of the chest demonstrates the lungs to be symmetrically aerated without evidence of mas
s, infiltrate or effusion.  The cardiomediastinal contours are unremarkable.  Osseous structures are 
intact.

 

CONCLUSION:     No acute disease.  

 

 

 

 Luis Antonio Garcia MD on January 14, 2018 at 23:58           

Board Certified Radiologist.

 This report was verified electronically.

## 2018-01-16 VITALS
RESPIRATION RATE: 16 BRPM | DIASTOLIC BLOOD PRESSURE: 74 MMHG | TEMPERATURE: 98.4 F | OXYGEN SATURATION: 96 % | SYSTOLIC BLOOD PRESSURE: 122 MMHG | HEART RATE: 77 BPM

## 2018-01-16 VITALS
SYSTOLIC BLOOD PRESSURE: 137 MMHG | HEART RATE: 101 BPM | OXYGEN SATURATION: 98 % | DIASTOLIC BLOOD PRESSURE: 85 MMHG | RESPIRATION RATE: 31 BRPM

## 2018-01-16 VITALS
TEMPERATURE: 99.4 F | DIASTOLIC BLOOD PRESSURE: 81 MMHG | OXYGEN SATURATION: 97 % | SYSTOLIC BLOOD PRESSURE: 122 MMHG | RESPIRATION RATE: 29 BRPM | HEART RATE: 91 BPM

## 2018-01-16 VITALS
OXYGEN SATURATION: 96 % | DIASTOLIC BLOOD PRESSURE: 75 MMHG | SYSTOLIC BLOOD PRESSURE: 129 MMHG | HEART RATE: 81 BPM | RESPIRATION RATE: 17 BRPM

## 2018-01-16 VITALS
HEART RATE: 88 BPM | OXYGEN SATURATION: 97 % | TEMPERATURE: 98.2 F | DIASTOLIC BLOOD PRESSURE: 72 MMHG | SYSTOLIC BLOOD PRESSURE: 115 MMHG | RESPIRATION RATE: 29 BRPM

## 2018-01-16 VITALS
SYSTOLIC BLOOD PRESSURE: 134 MMHG | HEART RATE: 94 BPM | OXYGEN SATURATION: 99 % | DIASTOLIC BLOOD PRESSURE: 88 MMHG | RESPIRATION RATE: 27 BRPM

## 2018-01-16 VITALS
HEART RATE: 99 BPM | SYSTOLIC BLOOD PRESSURE: 127 MMHG | RESPIRATION RATE: 14 BRPM | OXYGEN SATURATION: 96 % | DIASTOLIC BLOOD PRESSURE: 83 MMHG

## 2018-01-16 VITALS
OXYGEN SATURATION: 97 % | RESPIRATION RATE: 14 BRPM | SYSTOLIC BLOOD PRESSURE: 134 MMHG | DIASTOLIC BLOOD PRESSURE: 74 MMHG | TEMPERATURE: 99.3 F | HEART RATE: 96 BPM

## 2018-01-16 VITALS
TEMPERATURE: 98.8 F | HEART RATE: 70 BPM | RESPIRATION RATE: 19 BRPM | SYSTOLIC BLOOD PRESSURE: 127 MMHG | DIASTOLIC BLOOD PRESSURE: 81 MMHG | OXYGEN SATURATION: 97 %

## 2018-01-16 VITALS — HEART RATE: 90 BPM

## 2018-01-16 VITALS
DIASTOLIC BLOOD PRESSURE: 87 MMHG | HEART RATE: 87 BPM | RESPIRATION RATE: 25 BRPM | OXYGEN SATURATION: 99 % | SYSTOLIC BLOOD PRESSURE: 139 MMHG

## 2018-01-16 VITALS
HEART RATE: 86 BPM | SYSTOLIC BLOOD PRESSURE: 125 MMHG | RESPIRATION RATE: 20 BRPM | DIASTOLIC BLOOD PRESSURE: 82 MMHG | OXYGEN SATURATION: 96 %

## 2018-01-16 VITALS
TEMPERATURE: 98.1 F | DIASTOLIC BLOOD PRESSURE: 72 MMHG | OXYGEN SATURATION: 98 % | HEART RATE: 92 BPM | RESPIRATION RATE: 19 BRPM | SYSTOLIC BLOOD PRESSURE: 123 MMHG

## 2018-01-16 VITALS
OXYGEN SATURATION: 100 % | RESPIRATION RATE: 24 BRPM | SYSTOLIC BLOOD PRESSURE: 143 MMHG | HEART RATE: 106 BPM | DIASTOLIC BLOOD PRESSURE: 88 MMHG

## 2018-01-16 VITALS — HEART RATE: 101 BPM | RESPIRATION RATE: 27 BRPM | OXYGEN SATURATION: 97 %

## 2018-01-16 VITALS — HEART RATE: 79 BPM

## 2018-01-16 VITALS
DIASTOLIC BLOOD PRESSURE: 93 MMHG | SYSTOLIC BLOOD PRESSURE: 139 MMHG | OXYGEN SATURATION: 96 % | HEART RATE: 95 BPM | RESPIRATION RATE: 18 BRPM

## 2018-01-16 VITALS — RESPIRATION RATE: 20 BRPM | OXYGEN SATURATION: 97 % | HEART RATE: 97 BPM

## 2018-01-16 VITALS — HEART RATE: 67 BPM

## 2018-01-16 VITALS — HEART RATE: 82 BPM

## 2018-01-16 LAB
BASOPHILS # BLD AUTO: 0.1 TH/MM3 (ref 0–0.2)
BASOPHILS NFR BLD: 0.8 % (ref 0–2)
BUN SERPL-MCNC: 11 MG/DL (ref 7–18)
CALCIUM SERPL-MCNC: 8.7 MG/DL (ref 8.5–10.1)
CHLORIDE SERPL-SCNC: 102 MEQ/L (ref 98–107)
CREAT SERPL-MCNC: 1.31 MG/DL (ref 0.6–1.3)
EOSINOPHIL # BLD: 0.1 TH/MM3 (ref 0–0.4)
EOSINOPHIL NFR BLD: 1.2 % (ref 0–4)
ERYTHROCYTE [DISTWIDTH] IN BLOOD BY AUTOMATED COUNT: 13.7 % (ref 11.6–17.2)
GFR SERPLBLD BASED ON 1.73 SQ M-ARVRAT: 60 ML/MIN (ref 89–?)
GLUCOSE SERPL-MCNC: 218 MG/DL (ref 74–106)
HCO3 BLD-SCNC: 24.8 MEQ/L (ref 21–32)
HCT VFR BLD CALC: 42 % (ref 39–51)
HGB BLD-MCNC: 14.2 GM/DL (ref 13–17)
LYMPHOCYTES # BLD AUTO: 2.8 TH/MM3 (ref 1–4.8)
LYMPHOCYTES NFR BLD AUTO: 27.5 % (ref 9–44)
MCH RBC QN AUTO: 30.1 PG (ref 27–34)
MCHC RBC AUTO-ENTMCNC: 33.9 % (ref 32–36)
MCV RBC AUTO: 88.9 FL (ref 80–100)
MONOCYTE #: 0.9 TH/MM3 (ref 0–0.9)
MONOCYTES NFR BLD: 8.3 % (ref 0–8)
NEUTROPHILS # BLD AUTO: 6.4 TH/MM3 (ref 1.8–7.7)
NEUTROPHILS NFR BLD AUTO: 62.2 % (ref 16–70)
PLATELET # BLD: 267 TH/MM3 (ref 150–450)
PMV BLD AUTO: 7.3 FL (ref 7–11)
RBC # BLD AUTO: 4.72 MIL/MM3 (ref 4.5–5.9)
SODIUM SERPL-SCNC: 137 MEQ/L (ref 136–145)
WBC # BLD AUTO: 10.3 TH/MM3 (ref 4–11)

## 2018-01-16 RX ADMIN — INSULIN ASPART SCH: 100 INJECTION, SOLUTION INTRAVENOUS; SUBCUTANEOUS at 17:00

## 2018-01-16 RX ADMIN — MORPHINE SULFATE PRN MG: 2 INJECTION, SOLUTION INTRAMUSCULAR; INTRAVENOUS at 06:37

## 2018-01-16 RX ADMIN — Medication SCH ML: at 21:14

## 2018-01-16 RX ADMIN — ACYCLOVIR SCH UNITS: 800 TABLET ORAL at 08:24

## 2018-01-16 RX ADMIN — Medication SCH ML: at 08:46

## 2018-01-16 RX ADMIN — CHLORHEXIDINE GLUCONATE SCH PACK: 500 CLOTH TOPICAL at 04:00

## 2018-01-16 RX ADMIN — INSULIN ASPART SCH UNITS: 100 INJECTION, SOLUTION INTRAVENOUS; SUBCUTANEOUS at 08:00

## 2018-01-16 RX ADMIN — INSULIN ASPART SCH UNITS: 100 INJECTION, SOLUTION INTRAVENOUS; SUBCUTANEOUS at 12:37

## 2018-01-16 RX ADMIN — ACYCLOVIR SCH UNITS: 800 TABLET ORAL at 21:15

## 2018-01-16 RX ADMIN — MORPHINE SULFATE PRN MG: 2 INJECTION, SOLUTION INTRAMUSCULAR; INTRAVENOUS at 18:34

## 2018-01-16 RX ADMIN — POTASSIUM CHLORIDE SCH MLS/HR: 200 INJECTION, SOLUTION INTRAVENOUS at 17:38

## 2018-01-16 RX ADMIN — HYDROCODONE BITARTRATE AND ACETAMINOPHEN PRN TAB: 5; 325 TABLET ORAL at 08:33

## 2018-01-16 RX ADMIN — INSULIN ASPART SCH: 100 INJECTION, SOLUTION INTRAVENOUS; SUBCUTANEOUS at 08:00

## 2018-01-16 RX ADMIN — POTASSIUM CHLORIDE SCH MLS/HR: 200 INJECTION, SOLUTION INTRAVENOUS at 21:22

## 2018-01-16 RX ADMIN — INSULIN ASPART SCH: 100 INJECTION, SOLUTION INTRAVENOUS; SUBCUTANEOUS at 21:23

## 2018-01-16 RX ADMIN — MORPHINE SULFATE PRN MG: 2 INJECTION, SOLUTION INTRAMUSCULAR; INTRAVENOUS at 00:42

## 2018-01-16 RX ADMIN — POTASSIUM CHLORIDE SCH MEQ: 20 TABLET, EXTENDED RELEASE ORAL at 21:15

## 2018-01-16 RX ADMIN — ONDANSETRON PRN MG: 2 INJECTION, SOLUTION INTRAMUSCULAR; INTRAVENOUS at 21:15

## 2018-01-16 RX ADMIN — STANDARDIZED SENNA CONCENTRATE AND DOCUSATE SODIUM SCH TAB: 8.6; 5 TABLET, FILM COATED ORAL at 21:15

## 2018-01-16 RX ADMIN — INSULIN ASPART SCH: 100 INJECTION, SOLUTION INTRAVENOUS; SUBCUTANEOUS at 12:37

## 2018-01-16 RX ADMIN — MORPHINE SULFATE PRN MG: 2 INJECTION, SOLUTION INTRAMUSCULAR; INTRAVENOUS at 12:56

## 2018-01-16 RX ADMIN — STANDARDIZED SENNA CONCENTRATE AND DOCUSATE SODIUM SCH TAB: 8.6; 5 TABLET, FILM COATED ORAL at 08:25

## 2018-01-16 RX ADMIN — INSULIN ASPART SCH UNITS: 100 INJECTION, SOLUTION INTRAVENOUS; SUBCUTANEOUS at 17:00

## 2018-01-16 NOTE — HHI.PR
Subjective


Remarks


Follow-up for abdominal pain, hematemesis, nausea vomiting, DKA. Patient 

complains of abdominal pain and feeling very week. With RN's help, I tried to 

stand him up and he felt very weak to stand up on the floor. However, later RN/

Charge nurse were able to stand him up. PT also evaluated patient and 

recommended no rehab.





Objective


Vitals





Vital Signs








  Date Time  Temp Pulse Resp B/P (MAP) Pulse Ox O2 Delivery O2 Flow Rate FiO2


 


1/16/18 09:18  106 24 143/88 (106) 100   


 


1/16/18 09:00  94 27 134/88 (103) 99   


 


1/16/18 08:00 99.4 91 29 122/81 (95) 97   


 


1/16/18 08:00  91      


 


1/16/18 07:00  86 20 125/82 (96) 96   


 


1/16/18 06:42   18     


 


1/16/18 06:00  90      


 


1/16/18 04:00 98.4 77 16 122/74 (90) 96   


 


1/16/18 04:00  77      


 


1/16/18 02:00  82      


 


1/16/18 00:00 98.1 92 19 123/72 (89) 98   


 


1/16/18 00:00  92      


 


1/15/18 22:00  100      


 


1/15/18 20:00 98.3 92 20 124/78 (93) 96   


 


1/15/18 20:00  92      


 


1/15/18 18:00  109      


 


1/15/18 16:00 97.5 96 26 146/87 (106) 97   


 


1/15/18 16:00  96      


 


1/15/18 15:00  95 23 148/85 (106) 98   


 


1/15/18 14:00  96      


 


1/15/18 14:00  96 26 124/66 (85) 99   


 


1/15/18 13:00  92 23 122/72 (89) 98   


 


1/15/18 12:00 97.5 100 19 123/67 (85) 97   


 


1/15/18 12:00  100      


 


1/15/18 11:00  100 19 114/66 (82) 99   














I/O      


 


 1/15/18 1/15/18 1/15/18 1/16/18 1/16/18 1/16/18





 07:00 15:00 23:00 07:00 15:00 23:00


 


Intake Total 1100 ml 1139.7 ml 240 ml 480 ml  


 


Output Total 630 ml  200 ml 1150 ml 180 ml 


 


Balance 470 ml 1139.7 ml 40 ml -670 ml -180 ml 


 


      


 


Intake Oral   240 ml 480 ml  


 


IV Total 1100 ml 1139.7 ml    


 


Output Urine Total 630 ml  200 ml 1150 ml 180 ml 


 


# Voids     1 


 


# Bowel Movements 0  0   








Result Diagram:  


1/14/18 2104                                                                   

             1/15/18 0930





Imaging





Last Impressions








Abdomen/Pelvis CT 1/14/18 2236 Signed





Impressions: 





 Service Date/Time:  Sunday, January 14, 2018 22:31 - CONCLUSION:  1. No acute 





 abnormality seen. 2. Minimal hepatic steatosis.      Luis Antonio Garcia MD 


 


Chest X-Ray 1/14/18 0000 Signed





Impressions: 





 Service Date/Time:  Sunday, January 14, 2018 23:32 - CONCLUSION: No acute 





 disease.       Luis Antonio Garcia MD 








Objective Remarks


GENERAL: Alert, oriented 3, NAD.  Somewhat lethargic.


SKIN: Warm and dry.


HEAD: Normocephalic.


EYES: No scleral icterus. No injection or drainage. 


NECK: Supple, trachea midline. No JVD or lymphadenopathy.


CARDIOVASCULAR: Regular rate and rhythm without murmurs, gallops, or rubs. 


RESPIRATORY: Breath sounds equal bilaterally. No accessory muscle use.


GASTROINTESTINAL: Tenderness on palpation over epigastric and left lower 

quadrant.  No distention.


MUSCULOSKELETAL: No cyanosis, or edema. 


BACK: Nontender without obvious deformity. No CVA tenderness.





Procedures


None





A/P


Problem List:  


(1) DKA (diabetic ketoacidoses)


ICD Code:  E13.10 - DKA (diabetic ketoacidoses)


Status:  Acute


(2) Lactic acidosis


ICD Code:  E87.2 - Acidosis


(3) Hematemesis


ICD Code:  K92.0 - Hematemesis


Status:  Acute


(4) Leukocytosis


ICD Code:  D72.829 - Leukocytosis


Status:  Acute


Assessment and Plan


This is a 43-year-old male with a PMH of DM, Anxiety, Depression, Bipolar 

Disorder, Seizure Disorder, Non-Compliance, h/o Pancreatitis and GERD who 

presented to the ER w/ complaints of abdominal pain, nausea/vomiting and 

hematemesis x4-5 days. On arrival, /88, , O2 sat 98% on RA, 

Afebrile.  WBC 21.9.  Creatinine 2.43, previously 0.91 on 11/6/15.  CO2 10.8.  

, Lactic Acid 8.8.  B-hydroxy 9.37.  ABG w/ pH 7.14.  Started on DKA 

Protocol.  CT Abd/Pelvis w/ no acute findings.





- Diabetic ketoacidosis


- Diabetes mellitus


   - Currently on subcutaneous insulin.  Patient is tolerating diet well.  We'

ll advance diet


   - Continue Levemir 10 units to 12 hours. 


   - Continue sliding scale insulin and add pre-meal insulin 4 units aspart 3 

times a day before meals with holding parameters.


   - Hemoglobin A1c 11.2





- Hypokalemia K+ 3.0 today. We will replace with IV and PO KCL. 





- PARVEEN - Creatinine 2.43 on admission ---> 1.31 today. 





- History of seizure disorder - continue Keppra 750 daily





- Transfer to the floor.  PT recommends no rehab or HH. 





Probable discharge on 1/17/2018.





Full code.  SCDs.





Discussed with RN.





Problem Qualifiers





(1) DKA (diabetic ketoacidoses):  


Qualified Codes:  E10.10 - Type 1 diabetes mellitus with ketoacidosis without 

coma


(2) Hematemesis:  


Qualified Codes:  K92.0 - Hematemesis








Ancelmo Paez DO Jan 16, 2018 10:05

## 2018-01-17 VITALS
RESPIRATION RATE: 18 BRPM | HEART RATE: 82 BPM | TEMPERATURE: 98.3 F | SYSTOLIC BLOOD PRESSURE: 111 MMHG | DIASTOLIC BLOOD PRESSURE: 71 MMHG | OXYGEN SATURATION: 95 %

## 2018-01-17 VITALS
DIASTOLIC BLOOD PRESSURE: 79 MMHG | HEART RATE: 80 BPM | TEMPERATURE: 98.2 F | OXYGEN SATURATION: 97 % | RESPIRATION RATE: 17 BRPM | SYSTOLIC BLOOD PRESSURE: 131 MMHG

## 2018-01-17 VITALS
HEART RATE: 75 BPM | OXYGEN SATURATION: 97 % | DIASTOLIC BLOOD PRESSURE: 61 MMHG | RESPIRATION RATE: 20 BRPM | SYSTOLIC BLOOD PRESSURE: 97 MMHG | TEMPERATURE: 97.9 F

## 2018-01-17 VITALS
SYSTOLIC BLOOD PRESSURE: 133 MMHG | RESPIRATION RATE: 16 BRPM | OXYGEN SATURATION: 95 % | DIASTOLIC BLOOD PRESSURE: 76 MMHG | TEMPERATURE: 98 F | HEART RATE: 86 BPM

## 2018-01-17 VITALS — HEART RATE: 68 BPM

## 2018-01-17 VITALS — HEART RATE: 75 BPM

## 2018-01-17 VITALS — HEART RATE: 79 BPM

## 2018-01-17 RX ADMIN — INSULIN ASPART SCH: 100 INJECTION, SOLUTION INTRAVENOUS; SUBCUTANEOUS at 08:00

## 2018-01-17 RX ADMIN — HYDROCODONE BITARTRATE AND ACETAMINOPHEN PRN TAB: 5; 325 TABLET ORAL at 08:50

## 2018-01-17 RX ADMIN — CHLORHEXIDINE GLUCONATE SCH PACK: 500 CLOTH TOPICAL at 04:00

## 2018-01-17 RX ADMIN — POTASSIUM CHLORIDE SCH MEQ: 20 TABLET, EXTENDED RELEASE ORAL at 08:41

## 2018-01-17 RX ADMIN — ACYCLOVIR SCH UNITS: 800 TABLET ORAL at 08:43

## 2018-01-17 RX ADMIN — Medication SCH ML: at 08:43

## 2018-01-17 RX ADMIN — STANDARDIZED SENNA CONCENTRATE AND DOCUSATE SODIUM SCH TAB: 8.6; 5 TABLET, FILM COATED ORAL at 08:41

## 2018-01-17 RX ADMIN — INSULIN ASPART SCH UNITS: 100 INJECTION, SOLUTION INTRAVENOUS; SUBCUTANEOUS at 08:41

## 2018-01-17 NOTE — HHI.DS
__________________________________________________





Discharge Summary


Admission Date


Jan 14, 2018 at 11:09 pm


Discharge Date:  Jan 17, 2018


Admitting Diagnosis





DKA/Hematemesis/Dehydration





(1) DKA (diabetic ketoacidoses)


ICD Code:  E13.10 - DKA (diabetic ketoacidoses)


Status:  Acute


(2) Lactic acidosis


ICD Code:  E87.2 - Acidosis


(3) Hematemesis


ICD Code:  K92.0 - Hematemesis


Status:  Acute


(4) Leukocytosis


ICD Code:  D72.829 - Leukocytosis


Status:  Acute


Procedures


None


Brief History - From Admission


This is a 42-year-old male with a PMH of DM, Anxiety, Depression, Bipolar 

Disorder, Seizure Disorder, Non-Compliance, h/o Pancreatitis and GERD who 

presented to the ER w/ complaints of abdominal pain, nausea/vomiting and 

hematemesis x4-5 days.  Reports generalized abdominal pain, intermittent, 

cramping, severe 8/10, non-radiating, associated w/ nausea/vomiting.  Today, 

noted episode of bloody emesis.  Denies fever, chills, diarrhea or sick 

contacts.  Does not check BS regularly, non-compliant w/ medications.  On 

arrival, /88, , O2 sat 98% on RA, Afebrile.  WBC 21.9.  Creatinine 

2.43, previously 0.91 on 11/6/15.  CO2 10.8.  , Lactic Acid 8.8.  B-

hydroxy 9.37.  ABG w/ pH 7.14.  Started on DKA Protocol.  CT Abd/Pelvis w/ no 

acute findings.


CBC/BMP:  


1/16/18 0947                                                                   

             1/16/18 0947





Significant Findings





Laboratory Tests








Test


  1/14/18


20:59 1/14/18


21:02 1/14/18


21:04 1/14/18


21:41


 


Blood Urea Nitrogen


  32 MG/DL


(7-18) 


  


  


 


 


Creatinine


  2.43 MG/DL


(0.60-1.30) 


  


  


 


 


Random Glucose


  562 MG/DL


() 


  


  


 


 


Total Protein


  9.0 GM/DL


(6.4-8.2) 


  


  


 


 


Phosphorus Level


  8.9 MG/DL


(2.5-4.9) 


  


  


 


 


Alkaline Phosphatase


  138 U/L


() 


  


  


 


 


Aspartate Amino Transf


(AST/SGOT) 10 U/L (15-37) 


  


  


  


 


 


Sodium Level


  128 MEQ/L


(136-145) 


  


  


 


 


Chloride Level


  86 MEQ/L


() 


  


  


 


 


Carbon Dioxide Level


  10.8 MEQ/L


(21.0-32.0) 


  


  


 


 


Anion Gap


  31 MEQ/L


(5-15) 


  


  


 


 


Estimat Glomerular Filtration


Rate 30 ML/MIN


(>89) 


  


  


 


 


B-Hydroxybutyrate


  9.37 MMOL/L


(0.00-0.39) 


  


  


 


 


Lactic Acid Level


  


  8.8 mmol/L


(0.4-2.0) 


  


 


 


White Blood Count


  


  


  21.9 TH/MM3


(4.0-11.0) 


 


 


Red Blood Count


  


  


  6.11 MIL/MM3


(4.50-5.90) 


 


 


Hemoglobin


  


  


  18.1 GM/DL


(13.0-17.0) 


 


 


Hematocrit


  


  


  56.4 %


(39.0-51.0) 


 


 


Neutrophils (%) (Auto)


  


  


  90.4 %


(16.0-70.0) 


 


 


Lymphocytes (%) (Auto)


  


  


  5.4 %


(9.0-44.0) 


 


 


Neutrophils # (Auto)


  


  


  19.8 TH/MM3


(1.8-7.7) 


 


 


Activated Partial


Thromboplast Time 


  


  LESS THAN 19.0


SEC 


 


 


Serum Osmolality


  


  


  352 MOSM/KG


(275-295) 


 


 


Venous Blood pH


  


  


  


  7.14


(7.360-7.400)


 


Venous Blood Partial Pressure


CO2 


  


  


  33 mmHg


(44-48)


 


Venous Blood HCO3


  


  


  


  11 mmol/L


(22-26)


 


Venous Blood Oxygen Saturation    57 % (70-76) 


 


Venous Blood Base Excess


  


  


  


  -16.5 mmol/L


(-2-2)


 


Test


  1/15/18


00:00 1/15/18


00:35 1/15/18


01:30 1/15/18


04:15


 


Urine Glucose (UA)


  1000 mg/dL


(NEG) 


  


  


 


 


Urine Ketones


  150 mg/dL


(NEG) 


  


  


 


 


Urine Mucus FEW /lpf (OCC)    


 


Lactic Acid Level


  


  2.1 mmol/L


(0.4-2.0) 


  


 


 


Blood Urea Nitrogen


  


  


  


  25 MG/DL


(7-18)


 


Creatinine


  


  


  


  1.44 MG/DL


(0.60-1.30)


 


Random Glucose


  


  


  


  169 MG/DL


()


 


Calcium Level


  


  


  


  8.4 MG/DL


(8.5-10.1)


 


Phosphorus Level


  


  


  


  0.8 MG/DL


(2.5-4.9)


 


Chloride Level


  


  


  


  108 MEQ/L


()


 


Estimat Glomerular Filtration


Rate 


  


  


  54 ML/MIN


(>89)


 


Test


  1/15/18


09:30 1/16/18


09:47 


  


 


 


Blood Urea Nitrogen


  21 MG/DL


(7-18) 


  


  


 


 


Random Glucose


  193 MG/DL


() 218 MG/DL


() 


  


 


 


Phosphorus Level


  0.8 MG/DL


(2.5-4.9) 


  


  


 


 


Chloride Level


  110 MEQ/L


() 


  


  


 


 


Estimat Glomerular Filtration


Rate 63 ML/MIN


(>89) 60 ML/MIN


(>89) 


  


 


 


Hemoglobin A1c


  11.2 %


(4.3-6.0) 


  


  


 


 


B-Hydroxybutyrate


  2.20 MMOL/L


(0.00-0.39) 


  


  


 


 


Monocytes (%) (Auto)


  


  8.3 %


(0.0-8.0) 


  


 


 


Creatinine


  


  1.31 MG/DL


(0.60-1.30) 


  


 


 


Potassium Level


  


  3.0 MEQ/L


(3.5-5.1) 


  


 








Imaging





Last Impressions








Abdomen/Pelvis CT 1/14/18 2236 Signed





Impressions: 





 Service Date/Time:  Sunday, January 14, 2018 22:31 - CONCLUSION:  1. No acute 





 abnormality seen. 2. Minimal hepatic steatosis.      Luis Antonio Garcia MD 


 


Chest X-Ray 1/14/18 0000 Signed





Impressions: 





 Service Date/Time:  Sunday, January 14, 2018 23:32 - CONCLUSION: No acute 





 disease.       Luis Antonio Garcia MD 








PE at Discharge


GENERAL: Alert, oriented 3, NAD.  Somewhat lethargic.


SKIN: Warm and dry.


HEAD: Normocephalic.


EYES: No scleral icterus. No injection or drainage. 


NECK: Supple, trachea midline. No JVD or lymphadenopathy.


CARDIOVASCULAR: Regular rate and rhythm without murmurs, gallops, or rubs. 


RESPIRATORY: Breath sounds equal bilaterally. No accessory muscle use.


GASTROINTESTINAL: Tenderness on palpation over epigastric and left lower 

quadrant.  No distention.


MUSCULOSKELETAL: No cyanosis, or edema. 


BACK: Nontender without obvious deformity. No CVA tenderness.





Pt update on day of discharge


Patient is doing well. No acute concerns. No fever, chills.


Hospital Course


This is a 43-year-old male with a PMH of DM, Anxiety, Depression, Bipolar 

Disorder, Seizure Disorder, Non-Compliance, h/o Pancreatitis and GERD who 

presented to the ER w/ complaints of abdominal pain, nausea/vomiting and 

hematemesis x4-5 days. On arrival, /88, , O2 sat 98% on RA, 

Afebrile.  WBC 21.9.  Creatinine 2.43, previously 0.91 on 11/6/15.  CO2 10.8.  

, Lactic Acid 8.8.  B-hydroxy 9.37.  ABG w/ pH 7.14.  Started on DKA 

Protocol.  CT Abd/Pelvis w/ no acute findings.





- Diabetic ketoacidosis


- Diabetes mellitus


   - Currently on subcutaneous insulin.  Patient is tolerating diet well.  We'

ll advance diet


   - Continue Levemir 10 units to 12 hours. 


   - Continue sliding scale insulin and add pre-meal insulin 4 units aspart 3 

times a day before meals with holding parameters.


   - Hemoglobin A1c 11.2


   - Will add metformin on discharge as well. 





- Hypokalemia K+ 3.0. s/p replacement with IV and PO KCL. 





- PARVEEN - Creatinine 2.43 on admission ---> 1.31 today. 





- History of seizure disorder - continue Keppra 750 daily





- PT recommended no rehab or home health PT.


Pt Condition on Discharge:  Good


Discharge Disposition:  Discharge Home


Discharge Time:  <= 30 minutes


Discharge Instructions


DIET: Follow Instructions for:  Diabetic Diet


Activities you can perform:  Regular-No Restrictions


Follow up Referrals:  


Endocrinology - 1 Week


PCP Follow-up - 1 Week





New Medications:  


Insulin Aspart Inj (Novolog Inj) 1,000 Unit/10 Ml Vial


1-9 UNITS SQ ACHS for Blood Sugar Management, #10 ML 0 Refills


Max dose at bedtime:( )units; sugars less than 70,(0)units; sugars


 150-199,(1) unit; sugars 200-249,(3) units; sugars 250-299,(5) units;


 sugars 300-349,(7) units; sugars greater than 349,(9) units


Insulin Detemir Inj (Levemir Inj) 1,000 unit/ 10 ML Vial


12 UNITS SQ HS for Blood Sugar Management for 30 Days, VIAL 0 Refills


Do not mix with any other Insulin.


Metformin (Metformin) 500 Mg Tab


500 MG PO BIDPC for Blood Sugar Management, #60 TAB 0 Refills





 


Continued Medications:  


Levetiracetam (Keppra) 750 Mg Tab


750 MG PO DAILY for Control Seizures, #60 TAB 0 Refills





Quetiapine (Seroquel) 50 Mg Tab


50 MG PO HS, #30 TAB 0 Refills





Trazodone (Trazodone) 50 Mg Tab


50 MG PO HS for Control Depression, #30 TAB 0 Refills





 


Discontinued Medications:  


Insulin Human Regular Inj (Humulin R Inj) 1,000 Unit/10 Ml Vial


2-12 UNITS SQ ACHS for Blood Sugar Management, #10 ML 0 Refills


Max dose at bedtime:( )units; sugars < 70 (0)units; sugars 150-199,


 (2)units; sugars 200-249,(4)units; sugars 250-299, (7)units; sugars


 300-349,(10)units; sugars more than 349,(12)units.














Ancelmo Paez DO Jan 17, 2018 07:45

## 2018-04-02 ENCOUNTER — HOSPITAL ENCOUNTER (EMERGENCY)
Dept: HOSPITAL 17 - NEPE | Age: 43
Discharge: HOME | End: 2018-04-02
Payer: MEDICAID

## 2018-04-02 VITALS — DIASTOLIC BLOOD PRESSURE: 86 MMHG | SYSTOLIC BLOOD PRESSURE: 136 MMHG

## 2018-04-02 VITALS
SYSTOLIC BLOOD PRESSURE: 134 MMHG | DIASTOLIC BLOOD PRESSURE: 72 MMHG | HEART RATE: 78 BPM | OXYGEN SATURATION: 100 % | RESPIRATION RATE: 16 BRPM

## 2018-04-02 VITALS — BODY MASS INDEX: 40.08 KG/M2 | WEIGHT: 270.57 LBS | HEIGHT: 69 IN

## 2018-04-02 VITALS
TEMPERATURE: 98.5 F | RESPIRATION RATE: 18 BRPM | SYSTOLIC BLOOD PRESSURE: 163 MMHG | OXYGEN SATURATION: 97 % | DIASTOLIC BLOOD PRESSURE: 87 MMHG | HEART RATE: 99 BPM

## 2018-04-02 DIAGNOSIS — K59.00: ICD-10-CM

## 2018-04-02 DIAGNOSIS — E11.9: ICD-10-CM

## 2018-04-02 DIAGNOSIS — R11.2: Primary | ICD-10-CM

## 2018-04-02 DIAGNOSIS — R10.13: ICD-10-CM

## 2018-04-02 DIAGNOSIS — Z79.4: ICD-10-CM

## 2018-04-02 LAB
ALBUMIN SERPL-MCNC: 4.3 GM/DL (ref 3.4–5)
ALP SERPL-CCNC: 135 U/L (ref 45–117)
ALT SERPL-CCNC: 22 U/L (ref 12–78)
AST SERPL-CCNC: 18 U/L (ref 15–37)
BASOPHILS # BLD AUTO: 0 TH/MM3 (ref 0–0.2)
BASOPHILS NFR BLD: 0.2 % (ref 0–2)
BILIRUB SERPL-MCNC: 0.3 MG/DL (ref 0.2–1)
BUN SERPL-MCNC: 11 MG/DL (ref 7–18)
CALCIUM SERPL-MCNC: 9.8 MG/DL (ref 8.5–10.1)
CHLORIDE SERPL-SCNC: 100 MEQ/L (ref 98–107)
COLOR UR: (no result)
CREAT SERPL-MCNC: 1.27 MG/DL (ref 0.6–1.3)
EOSINOPHIL # BLD: 0.1 TH/MM3 (ref 0–0.4)
EOSINOPHIL NFR BLD: 0.4 % (ref 0–4)
ERYTHROCYTE [DISTWIDTH] IN BLOOD BY AUTOMATED COUNT: 14 % (ref 11.6–17.2)
GFR SERPLBLD BASED ON 1.73 SQ M-ARVRAT: 62 ML/MIN (ref 89–?)
GLUCOSE SERPL-MCNC: 372 MG/DL (ref 74–106)
GLUCOSE UR STRIP-MCNC: 1000 MG/DL
HCO3 BLD-SCNC: 29.1 MEQ/L (ref 21–32)
HCT VFR BLD CALC: 47.8 % (ref 39–51)
HGB BLD-MCNC: 15.8 GM/DL (ref 13–17)
HGB UR QL STRIP: (no result)
INR PPP: 0.9 RATIO
KETONES UR STRIP-MCNC: (no result) MG/DL
LYMPHOCYTES # BLD AUTO: 1.5 TH/MM3 (ref 1–4.8)
LYMPHOCYTES NFR BLD AUTO: 7.8 % (ref 9–44)
MAGNESIUM SERPL-MCNC: 1.9 MG/DL (ref 1.5–2.5)
MCH RBC QN AUTO: 29.4 PG (ref 27–34)
MCHC RBC AUTO-ENTMCNC: 33.1 % (ref 32–36)
MCV RBC AUTO: 88.8 FL (ref 80–100)
MONOCYTE #: 0.5 TH/MM3 (ref 0–0.9)
MONOCYTES NFR BLD: 2.6 % (ref 0–8)
NEUTROPHILS # BLD AUTO: 16.7 TH/MM3 (ref 1.8–7.7)
NEUTROPHILS NFR BLD AUTO: 89 % (ref 16–70)
NITRITE UR QL STRIP: (no result)
PLATELET # BLD: 345 TH/MM3 (ref 150–450)
PMV BLD AUTO: 8.2 FL (ref 7–11)
PROT SERPL-MCNC: 7.7 GM/DL (ref 6.4–8.2)
PROTHROMBIN TIME: 9.4 SEC (ref 9.8–11.6)
RBC # BLD AUTO: 5.39 MIL/MM3 (ref 4.5–5.9)
SODIUM SERPL-SCNC: 137 MEQ/L (ref 136–145)
SP GR UR STRIP: 1.04 (ref 1–1.03)
URINE LEUKOCYTE ESTERASE: (no result)
WBC # BLD AUTO: 18.7 TH/MM3 (ref 4–11)

## 2018-04-02 PROCEDURE — 82010 KETONE BODYS QUAN: CPT

## 2018-04-02 PROCEDURE — 80178 ASSAY OF LITHIUM: CPT

## 2018-04-02 PROCEDURE — 85730 THROMBOPLASTIN TIME PARTIAL: CPT

## 2018-04-02 PROCEDURE — 83735 ASSAY OF MAGNESIUM: CPT

## 2018-04-02 PROCEDURE — 83690 ASSAY OF LIPASE: CPT

## 2018-04-02 PROCEDURE — 80053 COMPREHEN METABOLIC PANEL: CPT

## 2018-04-02 PROCEDURE — 85025 COMPLETE CBC W/AUTO DIFF WBC: CPT

## 2018-04-02 PROCEDURE — 81001 URINALYSIS AUTO W/SCOPE: CPT

## 2018-04-02 PROCEDURE — 96374 THER/PROPH/DIAG INJ IV PUSH: CPT

## 2018-04-02 PROCEDURE — 85610 PROTHROMBIN TIME: CPT

## 2018-04-02 PROCEDURE — 74177 CT ABD & PELVIS W/CONTRAST: CPT

## 2018-04-02 PROCEDURE — 99285 EMERGENCY DEPT VISIT HI MDM: CPT

## 2018-04-02 NOTE — PD
HPI


Chief Complaint:  Abdominal Pain


Time Seen by Provider:  14:24


Travel History


International Travel<30 days:  No


Contact w/Intl Traveler<30days:  No


Traveled to known affect area:  No





History of Present Illness


HPI


Patient comes emergency department for evaluation of epigastric abdominal pain 

that began yesterday.  Patient reports pain feels similar to the pancreatitis 

pain that he has had in the past.  Describes pain as a stabbing twisting 

sensation in his epigastric region and radiates to his left lower quadrant.  

Patient denies anything making symptoms better or worse.  Denies any loss or 

change in bowel or bladder, chest pain, shortness of breath, fevers, headache, 

numbness or tingling anywhere.  Patient states that he tries member take his 

medication but does not always remember to take them.  Patient states that his 

blood sugars constantly run between 300 and 400 even with the medication.  

Patient states that he was waiting for his friend to come out of the hospital 

with someone saw him vomiting recommend he get evaluated.  Reports vomit is 

nonbloody nonbilious.  He states otherwise he would not have come to the ER.





PFSH


Past Medical History


Hx Anticoagulant Therapy:  No


Arthritis:  No


Asthma:  Yes (COMES AND GOES)


Autoimmune Disease:  No


Bipolar Disorder:  Yes


Anxiety:  Yes


Depression:  Yes


Heart Rhythm Problems:  No


Cancer:  No


Cardiovascular Problems:  No


High Cholesterol:  No


Chemotherapy:  No


Chest Pain:  No


Congestive Heart Failure:  No


COPD:  No


Cerebrovascular Accident:  No


Diabetes:  Yes


Diminished Hearing:  No


Endocrine:  Yes (pancreatitis)


Gastrointestinal Disorders:  Yes (HX OF ULCERS)


GERD:  Yes


Genitourinary:  No


Hiatal Hernia:  No


Immune Disorder:  No


Implanted Vascular Access Dvce:  No


Kidney Stones:  No


Musculoskeletal:  No


Neurologic:  Yes


Psychiatric:  Yes (dyslexia)


Reproductive:  No


Respiratory:  No


Migraines:  No


Pancreatitis:  Yes


Radiation Therapy:  No


Renal Failure:  No


Seizures:  Yes


Sickle Cell Disease:  No


Sleep Apnea:  No


Ulcer:  Yes





Past Surgical History


Abdominal Surgery:  No


AICD:  No


Arteriovenous Shunt:  No


Cardiac Surgery:  No


Ear Surgery:  No


Endocrine Surgery:  No


Eye Surgery:  No


Genitourinary Surgery:  No


Gynecologic Surgery:  No


Hysterectomy:  No


Insulin Pump:  No


Joint Replacement:  No


Pacemaker:  No


Thoracic Surgery:  No





Social History


Alcohol Use:  No (RARE)


Tobacco Use:  No


Substance Use:  Yes (WEED ONCE IN A WHILE )





Allergies-Medications


(Allergen,Severity, Reaction):  


Coded Allergies:  


     Pork/Porcine Containing Products (Verified  Allergy, Severe, ALLERGIC TO 

PORK INSULIN ONLY, 1/14/18)


     insulin aspart (Verified  Allergy, Severe, ITCHING, RASH TO PORK INSULIN 

ONLY, 1/14/18)


     insulin aspart protamine human (Verified  Allergy, Severe, ITCHING, RASH 

TO PORK INSULIN ONLY, 1/14/18)


     insulin detemir (Verified  Allergy, Severe, ITCHING, RASH TO PORK INSULIN 

ONLY, 1/14/18)


     insulin glargine (Verified  Allergy, Severe, ITCHING, RASH TO PORK INSULIN 

ONLY, 1/14/18)


     insulin isophane (NPH) (Verified  Allergy, Severe, ITCHING, RASH TO PORK 

INSULIN ONLY, 1/14/18)


     insulin lispro (Verified  Allergy, Severe, ITCHING, RASH TO PORK INSULIN 

ONLY, 1/14/18)


     insulin regular (Verified  Allergy, Severe, ITCHING, RASH TO PORK INSULIN 

ONLY, 1/14/18)


     penicillin G (Verified  Allergy, Severe, HIVES, 1/14/18)


Reported Meds & Prescriptions





Reported Meds & Active Scripts


Active


Zofran Odt (Ondansetron Odt) 4 Mg Tab 4 Mg SL Q6HR PRN


Metformin (Metformin HCl) 500 Mg Tab 500 Mg PO BIDPC


Novolog Inj (Insulin Aspart) 1,000 Unit/10 Ml Vial 1-9 Units SQ ACHS


     Max dose at bedtime:( )units; sugars less than 70,(0)units; sugars


     150-199,(1) unit; sugars 200-249,(3) units; sugars 250-299,(5) units;


     sugars 300-349,(7) units; sugars greater than 349,(9) units


Levemir Inj (Insulin Detemir) 1,000 unit/ 10 ML Vial 12 Units SQ HS 30 Days


     Do not mix with any other Insulin.


Reported


Trazodone (Trazodone HCl) 50 Mg Tab 50 Mg PO HS


Seroquel (Quetiapine Fumarate) 50 Mg Tab 50 Mg PO HS


Keppra (Levetiracetam) 750 Mg Tab 750 Mg PO DAILY








Review of Systems


Except as stated in HPI:  all other systems reviewed are Neg





Physical Exam


Narrative


GENERAL: Well-developed, well nourished, in no acute distress, and non-ill 

appearing.


SKIN: Focused skin assessment warm and dry.


HEAD: Atraumatic. Normocephalic. 


EYES: Pupils equal and round. EOMI. No scleral icterus. No injection or 

drainage. 


ENT: No nasal bleeding or discharge.  Mucous membranes pink and moist.


NECK: Trachea midline. No JVD. Supple.  No nuclear rigidity.


CARDIOVASCULAR: Regular rate and rhythm.  No murmur appreciated.


RESPIRATORY: No accessory muscle use.  No respiratory distress. Clear to 

auscultation. Breath sounds equal bilaterally. 


GASTROINTESTINAL: Abdomen soft, nondistended, and no guarding. Hepatic and 

splenic margins not palpable.  Normal bowel sounds x4.  No pulsatile mass.  

Patient reports tenderness greatest left lower quadrant to palpation.


MUSCULOSKELETAL: No obvious deformities. No clubbing.  No cyanosis.  No edema.  

Full range of motion.


NEUROLOGICAL: Awake and alert. No obvious cranial nerve deficits.  Motor 

grossly within normal limits. Normal speech.


PSYCHIATRIC: Appropriate mood and affect; insight and judgment normal.





Data


Data


Last Documented VS





Vital Signs








  Date Time  Temp Pulse Resp B/P (MAP) Pulse Ox O2 Delivery O2 Flow Rate FiO2


 


4/2/18 18:27  78 16 136/86 (103) 99   


 


4/2/18 14:30      Room Air  


 


4/2/18 13:03 98.5       








Orders





 Orders


Complete Blood Count With Diff (4/2/18 13:06)


Comprehensive Metabolic Panel (4/2/18 13:06)


Lipase (4/2/18 13:06)


Prothrombin Time / Inr (Pt) (4/2/18 13:06)


Act Partial Throm Time (Ptt) (4/2/18 13:06)


Urinalysis - C+S If Indicated (4/2/18 13:06)


Lithium (Li) (4/2/18 14:34)


Magnesium (Mg) (4/2/18 14:34)


Beta Hydroxybutyrate (Acetone) (4/2/18 14:34)


Ct Abd/Pel W Iv Contrast(Rout) (4/2/18 14:34)


Iv Access Insert/Monitor (4/2/18 14:34)


Ecg Monitoring (4/2/18 14:34)


Oximetry (4/2/18 14:34)


Ondansetron Inj (Zofran Inj) (4/2/18 14:45)


Sodium Chlor 0.9% 1000 Ml Inj (Ns 1000 M (4/2/18 14:34)


Sodium Chloride 0.9% Flush (Ns Flush) (4/2/18 14:45)


Iohexol 350 Inj (Omnipaque 350 Inj) (4/2/18 12:37)


Ed Discharge Order (4/2/18 18:04)





Labs





Laboratory Tests








Test


  4/2/18


13:30 4/2/18


13:40 4/2/18


14:40


 


Urine Color LIGHT-YELLOW   


 


Urine Turbidity CLEAR   


 


Urine pH 8.5   


 


Urine Specific Gravity 1.039   


 


Urine Protein NEG mg/dL   


 


Urine Glucose (UA) 1000 mg/dL   


 


Urine Ketones TRACE mg/dL   


 


Urine Occult Blood NEG   


 


Urine Nitrite NEG   


 


Urine Bilirubin NEG   


 


Urine Urobilinogen


  LESS THAN 2.0


MG/DL 


  


 


 


Urine Leukocyte Esterase NEG   


 


Urine WBC


  LESS THAN 1


/hpf 


  


 


 


Microscopic Urinalysis Comment


  CULT NOT


INDICATED 


  


 


 


White Blood Count  18.7 TH/MM3  


 


Red Blood Count  5.39 MIL/MM3  


 


Hemoglobin  15.8 GM/DL  


 


Hematocrit  47.8 %  


 


Mean Corpuscular Volume  88.8 FL  


 


Mean Corpuscular Hemoglobin  29.4 PG  


 


Mean Corpuscular Hemoglobin


Concent 


  33.1 % 


  


 


 


Red Cell Distribution Width  14.0 %  


 


Platelet Count  345 TH/MM3  


 


Mean Platelet Volume  8.2 FL  


 


Neutrophils (%) (Auto)  89.0 %  


 


Lymphocytes (%) (Auto)  7.8 %  


 


Monocytes (%) (Auto)  2.6 %  


 


Eosinophils (%) (Auto)  0.4 %  


 


Basophils (%) (Auto)  0.2 %  


 


Neutrophils # (Auto)  16.7 TH/MM3  


 


Lymphocytes # (Auto)  1.5 TH/MM3  


 


Monocytes # (Auto)  0.5 TH/MM3  


 


Eosinophils # (Auto)  0.1 TH/MM3  


 


Basophils # (Auto)  0.0 TH/MM3  


 


CBC Comment  AUTO DIFF  


 


Differential Comment


  


  AUTO DIFF


CONFIRMED 


 


 


Platelet Estimate  NORMAL  


 


Platelet Morphology Comment  NORMAL  


 


Blood Urea Nitrogen  11 MG/DL  


 


Creatinine  1.27 MG/DL  


 


Random Glucose  372 MG/DL  


 


Total Protein  7.7 GM/DL  


 


Albumin  4.3 GM/DL  


 


Calcium Level  9.8 MG/DL  


 


Alkaline Phosphatase  135 U/L  


 


Aspartate Amino Transf


(AST/SGOT) 


  18 U/L 


  


 


 


Alanine Aminotransferase


(ALT/SGPT) 


  22 U/L 


  


 


 


Total Bilirubin  0.3 MG/DL  


 


Sodium Level  137 MEQ/L  


 


Potassium Level  4.6 MEQ/L  


 


Chloride Level  100 MEQ/L  


 


Carbon Dioxide Level  29.1 MEQ/L  


 


Anion Gap  8 MEQ/L  


 


Estimat Glomerular Filtration


Rate 


  62 ML/MIN 


  


 


 


Lipase  117 U/L  


 


Prothrombin Time   9.4 SEC 


 


Prothromb Time International


Ratio 


  


  0.9 RATIO 


 


 


Activated Partial


Thromboplast Time 


  


  22.0 SEC 


 


 


Magnesium Level   1.9 MG/DL 


 


Lithium Level


  


  


  LESS THAN 0.1


MEQ/L


 


B-Hydroxybutyrate   0.40 MMOL/L 











MDM


Medical Decision Making


Medical Screen Exam Complete:  Yes


Emergency Medical Condition:  Yes


Interpretation(s)





Last Impressions








Abdomen/Pelvis CT 4/2/18 1434 Signed





Impressions: 





 Service Date/Time:  Monday, April 2, 2018 16:06 - CONCLUSION:  1. Stool 





 throughout the colon possibly representing an element of constipation. 2. 





 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 

Appendix 





 is otherwise radiographically normal. 3. Mild hepatic fatty infiltration. 4. 





 Otherwise, nothing acute to explain current clinical symptoms.     Grzegorz Gilbert MD 








Differential Diagnosis


Pancreatitis, gastritis, diverticulitis, constipation, obstipation, small bowel 

obstruction, metabolic disturbance


Narrative Course


The patient presented with upper epigastric abdominal pain suspicious for 

gastritis with mild constipation. There was no significant history of diarrhea 

and no fever. The patient appeared comfortable, well hydrated and the abdominal 

exam was unremarkable and minimal to nontender to me. Laboratory and 

radiographic evaluation revealed no significant abnormality. There was no 

evidence of an acute, surgical abdomen at this time. There was no clinical 

evidence to support cholecystitis/cholelithiasis, pancreatitis, perforation of 

gastric ulcer, colitis, diverticulitis, bacterial peritonitis, obstruction, 

volvulus, early appendicitis, or hernial incarceration or strangulation nor 

significant GIB at this time. There was no evidence to support vascular 

pathology such as AAA, mesenteric ischemia. There was also no clinical evidence 

by history, exam or risk factors to suggest atypical presentation of cardiac 

disease such as ACS, AMI or atypical angina. No evidence to suggest 

genitourinary etiology as well. During the course of the ED visit, the patient 

noted improvement. Clinical picture was discussed with the patient, as well as 

plan of care. The patient was instructed to follow up with their physician. 

Abdominal pain warnings were discussed with the patient. The patient is to 

return if worsens, pain worsens or changes, develop fever, inability to 

tolerate fluids with or without vomiting, unable to establish follow up or as 

needed. The patient agrees with plan.





Patient in no obvious distress upon re-evaluation. All pertinent laboratory/

Radiology result(s) discussed with patient. Patient was asked if they wanted to 

speak to my attending, which the patient did not wish to do at this time.  

Discussed patient with Dr. Rader prior to discharge, who is in agreement plan 

of care and disposition.  Any questions/concerns in reference to patient 

diagnosis/condition discussed and clarified prior to patient's discharge. 

Reinforced sheer importance of close follow up with patient's primary physician 

or primary care clinic. Instructed patient to return to ED immediately, if 

symptoms return/worsen. Patient showed understanding of above instructions.  

Further instructions and recommendations were detailed in discharge paperwork.  

Patient ambulated without difficulty out of ED at discharge.





Diagnosis





 Primary Impression:  


 Nausea & vomiting


 Qualified Codes:  R11.2 - Nausea with vomiting, unspecified


 Additional Impression:  


 Constipation


 Qualified Codes:  K59.00 - Constipation, unspecified


Referrals:  


[facility]


Patient Instructions:  Abdominal Pain (ED), Acute Nausea and Vomiting (ED), 

Constipation (ED), General Instructions





***Additional Instructions:  


Follow-up with your primary care physician 1-2 days for reevaluation.  Take all 

medication as prescribed.  Drink plenty of non-caffeinated nonalcoholic fluids.

  Use over-the-counter stool softener and laxatives as needed for constipation.

  Follow instructions on the packaging.  Return to the emergency department if 

symptoms get worse.


***Med/Other Pt SpecificInfo:  Prescription(s) given


Scripts


Ondansetron Odt (Zofran Odt) 4 Mg Tab


4 MG SL Q6HR Y for Nausea/Vomiting, #15 TAB 0 Refills


   Prov: Minh Rader MD         4/2/18


Disposition:  01 DISCHARGE HOME


Condition:  Stable











Dick Butterfield Apr 2, 2018 14:39

## 2018-04-02 NOTE — RADRPT
EXAM DATE/TIME:  04/02/2018 16:06 

 

[facility] COMPARISON:     

CT ABDOMEN & PELVIS W/O CONTRAST, January 14, 2018, 22:31.  CT ABDOMEN & PELVIS W CONTRAST, November 06, 2015, 5:25.

 

 

INDICATIONS :     

Patient complains of abdominal pain with vomiting.

                      

 

IV CONTRAST:     

92 cc Omnipaque 350 (iohexol) IV 

 

 

ORAL CONTRAST:      

No oral contrast ingested.

                      

 

RADIATION DOSE:     

7.89 CTDIvol (mGy) 

 

 

MEDICAL HISTORY :     

Ulcers. Pancreatitis. Diabetes mellitus type 1.

 

SURGICAL HISTORY :      

None. 

 

ENCOUNTER:      

Initial

 

ACUITY:      

1 day

 

PAIN SCALE:      

10/10

 

LOCATION:         

abdomen

 

TECHNIQUE:     

Volumetric scanning of the abdomen and pelvis was performed.  Using automated exposure control and ad
justment of the mA and/or kV according to patient size, radiation dose was kept as low as reasonably 
achievable to obtain optimal diagnostic quality images.  DICOM format image data is available electro
nically for review and comparison.  

 

FINDINGS:     

 

LOWER LUNGS:     

The visualized lower lungs are clear.

 

LIVER:     

Homogeneous but slightly decreased density without lesion.  There is no dilation of the biliary tree.
  No calcified gallstones.

 

SPLEEN:     

Normal size without lesion.

 

PANCREAS:     

Within normal limits.

 

KIDNEYS:     

Normal in size and shape.  There is no mass, stone or hydronephrosis.

 

ADRENAL GLANDS:     

Within normal limits.

 

VASCULAR:     

There is no aortic aneurysm.

 

BOWEL/MESENTERY:     

The stomach, small bowel, and colon demonstrate no acute abnormality.  There is no free intraperitone
al air or fluid. Large amount stool throughout the colon possibly representing an element of constipa
tion. 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 The appendix is otherwi
se radiographically normal.

 

ABDOMINAL WALL:     

Within normal limits.

 

RETROPERITONEUM:     

There is no lymphadenopathy. 

 

BLADDER:     

No wall thickening or mass. 

 

REPRODUCTIVE:     

Within normal limits.

 

INGUINAL:     

There is no lymphadenopathy or hernia. 

 

MUSCULOSKELETAL:     

Within normal limits for patient age. 

 

CONCLUSION:     

1. Stool throughout the colon possibly representing an element of constipation.

2. 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 Appendix is otherwise radi
ographically normal.

3. Mild hepatic fatty infiltration.

4. Otherwise, nothing acute to explain current clinical symptoms.

 

 

 

 Grzegorz Gilbert MD on April 02, 2018 at 16:48           

Board Certified Radiologist.

 This report was verified electronically.

## 2018-05-29 ENCOUNTER — HOSPITAL ENCOUNTER (EMERGENCY)
Dept: HOSPITAL 17 - NEPE | Age: 43
Discharge: HOME | End: 2018-05-29
Payer: MEDICAID

## 2018-05-29 VITALS — HEIGHT: 69 IN | BODY MASS INDEX: 26.12 KG/M2 | WEIGHT: 176.37 LBS

## 2018-05-29 VITALS
HEART RATE: 101 BPM | DIASTOLIC BLOOD PRESSURE: 93 MMHG | TEMPERATURE: 98.2 F | OXYGEN SATURATION: 98 % | RESPIRATION RATE: 18 BRPM | SYSTOLIC BLOOD PRESSURE: 143 MMHG

## 2018-05-29 VITALS — OXYGEN SATURATION: 98 %

## 2018-05-29 DIAGNOSIS — F31.9: ICD-10-CM

## 2018-05-29 DIAGNOSIS — F12.90: ICD-10-CM

## 2018-05-29 DIAGNOSIS — E11.43: ICD-10-CM

## 2018-05-29 DIAGNOSIS — K31.84: ICD-10-CM

## 2018-05-29 DIAGNOSIS — Z79.4: ICD-10-CM

## 2018-05-29 DIAGNOSIS — E11.65: Primary | ICD-10-CM

## 2018-05-29 LAB
ALBUMIN SERPL-MCNC: 4.4 GM/DL (ref 3.4–5)
ALP SERPL-CCNC: 113 U/L (ref 45–117)
ALT SERPL-CCNC: 19 U/L (ref 12–78)
AST SERPL-CCNC: 17 U/L (ref 15–37)
BASOPHILS # BLD AUTO: 0.1 TH/MM3 (ref 0–0.2)
BASOPHILS NFR BLD: 0.7 % (ref 0–2)
BILIRUB SERPL-MCNC: 0.5 MG/DL (ref 0.2–1)
BUN SERPL-MCNC: 12 MG/DL (ref 7–18)
CALCIUM SERPL-MCNC: 9 MG/DL (ref 8.5–10.1)
CHLORIDE SERPL-SCNC: 96 MEQ/L (ref 98–107)
CREAT SERPL-MCNC: 1.27 MG/DL (ref 0.6–1.3)
EOSINOPHIL # BLD: 0.1 TH/MM3 (ref 0–0.4)
EOSINOPHIL NFR BLD: 0.8 % (ref 0–4)
ERYTHROCYTE [DISTWIDTH] IN BLOOD BY AUTOMATED COUNT: 13.8 % (ref 11.6–17.2)
GFR SERPLBLD BASED ON 1.73 SQ M-ARVRAT: 62 ML/MIN (ref 89–?)
GLUCOSE SERPL-MCNC: 372 MG/DL (ref 74–106)
HCO3 BLD-SCNC: 25.2 MEQ/L (ref 21–32)
HCT VFR BLD CALC: 48.9 % (ref 39–51)
HGB BLD-MCNC: 16.6 GM/DL (ref 13–17)
LYMPHOCYTES # BLD AUTO: 2.3 TH/MM3 (ref 1–4.8)
LYMPHOCYTES NFR BLD AUTO: 22 % (ref 9–44)
MCH RBC QN AUTO: 29.6 PG (ref 27–34)
MCHC RBC AUTO-ENTMCNC: 33.9 % (ref 32–36)
MCV RBC AUTO: 87.3 FL (ref 80–100)
MONOCYTE #: 0.8 TH/MM3 (ref 0–0.9)
MONOCYTES NFR BLD: 8.1 % (ref 0–8)
NEUTROPHILS # BLD AUTO: 7.1 TH/MM3 (ref 1.8–7.7)
NEUTROPHILS NFR BLD AUTO: 68.4 % (ref 16–70)
PLATELET # BLD: 348 TH/MM3 (ref 150–450)
PMV BLD AUTO: 8 FL (ref 7–11)
PROT SERPL-MCNC: 7.7 GM/DL (ref 6.4–8.2)
RBC # BLD AUTO: 5.6 MIL/MM3 (ref 4.5–5.9)
SODIUM SERPL-SCNC: 135 MEQ/L (ref 136–145)
WBC # BLD AUTO: 10.3 TH/MM3 (ref 4–11)

## 2018-05-29 PROCEDURE — 80053 COMPREHEN METABOLIC PANEL: CPT

## 2018-05-29 PROCEDURE — 99284 EMERGENCY DEPT VISIT MOD MDM: CPT

## 2018-05-29 PROCEDURE — 96360 HYDRATION IV INFUSION INIT: CPT

## 2018-05-29 PROCEDURE — 83690 ASSAY OF LIPASE: CPT

## 2018-05-29 PROCEDURE — 85025 COMPLETE CBC W/AUTO DIFF WBC: CPT

## 2018-05-29 NOTE — PD
HPI


Chief Complaint:  Abdominal Pain


Time Seen by Provider:  21:24


Travel History


International Travel<30 days:  No


Contact w/Intl Traveler<30days:  No


Traveled to known affect area:  No





History of Present Illness


HPI


43-year-old male with history of diabetes, poorly controlled, gastritis, 

esophagitis, gastroparesis, multiple visits for nausea vomiting, presents 

emergency department for evaluation of some epigastric discomfort.  Patient 

states approximately 4 hours ago he threw up a chunk of what he thought was 

tissue from his stomach.  He put it in the freezer and then went to [employer] 

and had a fish sandwich.  He tells me he was only able to have a couple of 

bites of the fish sandwich and came home and felt nauseous still.  He decided 

to contact [facility] ambulance to bring him in for evaluation.  He brings the trunk 

with him.  He does tell me that 2 days ago he had a large piece of steak that 

was difficult to swallow and was approximately 2" x 2" in size.  He wonders if 

this may be what he vomited.  He denies any hematemesis.  He denies any bowel 

or bladder changes.  He has no recent illnesses, fever, or chills.  Patient 

states he "just wanted to be sure he was okay."





PFSH


Past Medical History


Hx Anticoagulant Therapy:  No


Arthritis:  No


Asthma:  Yes (COMES AND GOES)


Autoimmune Disease:  No


Bipolar Disorder:  Yes


Anxiety:  Yes


Depression:  Yes


Heart Rhythm Problems:  No


Cancer:  No


Cardiovascular Problems:  No


High Cholesterol:  No


Chemotherapy:  No


Chest Pain:  No


Congestive Heart Failure:  No


COPD:  No


Cerebrovascular Accident:  No


Diabetes:  Yes


Patient Takes Glucophage:  No


Diminished Hearing:  No


Endocrine:  Yes (pancreatitis)


Gastrointestinal Disorders:  Yes (HX OF ULCERS)


GERD:  Yes


Genitourinary:  No


Headaches:  No


Hiatal Hernia:  No


Heparin Induced Thrombocytopen:  No


Hypertension:  No


Immune Disorder:  No


Implanted Vascular Access Dvce:  No


Kidney Stones:  No


Musculoskeletal:  No


Neurologic:  Yes


Psychiatric:  Yes (dyslexia)


Reproductive:  No


Respiratory:  No


Migraines:  No


Pancreatitis:  Yes


Radiation Therapy:  No


Renal Failure:  No


Seizures:  Yes


Sickle Cell Disease:  No


Sleep Apnea:  No


Ulcer:  Yes





Past Surgical History


Abdominal Surgery:  No


AICD:  No


Arteriovenous Shunt:  No


Cardiac Surgery:  No


Ear Surgery:  No


Endocrine Surgery:  No


Eye Surgery:  No


Genitourinary Surgery:  No


Gynecologic Surgery:  No


Hysterectomy:  No


Insulin Pump:  No


Joint Replacement:  No


Neurologic Surgery:  No


Pacemaker:  No


Thoracic Surgery:  No


Other Surgery:  No





Social History


Alcohol Use:  No (RARE)


Tobacco Use:  No


Substance Use:  Yes (WEED ONCE IN A WHILE )





Allergies-Medications


(Allergen,Severity, Reaction):  


Coded Allergies:  


     Pork/Porcine Containing Products (Verified  Allergy, Severe, ALLERGIC TO 

PORK INSULIN ONLY, 5/29/18)


     insulin aspart (Verified  Allergy, Severe, ITCHING, RASH TO PORK INSULIN 

ONLY, 5/29/18)


     insulin aspart protamine human (Verified  Allergy, Severe, ITCHING, RASH 

TO PORK INSULIN ONLY, 5/29/18)


     insulin detemir (Verified  Allergy, Severe, ITCHING, RASH TO PORK INSULIN 

ONLY, 5/29/18)


     insulin glargine (Verified  Allergy, Severe, ITCHING, RASH TO PORK INSULIN 

ONLY, 5/29/18)


     insulin isophane (NPH) (Verified  Allergy, Severe, ITCHING, RASH TO PORK 

INSULIN ONLY, 5/29/18)


     insulin lispro (Verified  Allergy, Severe, ITCHING, RASH TO PORK INSULIN 

ONLY, 5/29/18)


     insulin regular (Verified  Allergy, Severe, ITCHING, RASH TO PORK INSULIN 

ONLY, 5/29/18)


     penicillin G (Verified  Allergy, Severe, HIVES, 5/29/18)


Reported Meds & Prescriptions





Reported Meds & Active Scripts


Active


Zofran Odt (Ondansetron Odt) 4 Mg Tab 4 Mg SL Q6HR PRN


Metformin (Metformin HCl) 500 Mg Tab 500 Mg PO BIDPC


Novolog Inj (Insulin Aspart) 1,000 Unit/10 Ml Vial 1-9 Units SQ ACHS


     Max dose at bedtime:( )units; sugars less than 70,(0)units; sugars


     150-199,(1) unit; sugars 200-249,(3) units; sugars 250-299,(5) units;


     sugars 300-349,(7) units; sugars greater than 349,(9) units


Levemir Inj (Insulin Detemir) 1,000 unit/ 10 ML Vial 12 Units SQ HS 30 Days


     Do not mix with any other Insulin.


Reported


Trazodone (Trazodone HCl) 50 Mg Tab 50 Mg PO HS


Seroquel (Quetiapine Fumarate) 50 Mg Tab 50 Mg PO HS


Keppra (Levetiracetam) 750 Mg Tab 750 Mg PO DAILY








Review of Systems


Except as stated in HPI:  all other systems reviewed are Neg





Physical Exam


Narrative


GENERAL: Well-nourished male patient, no acute distress.


SKIN: Focused skin assessment warm/dry.


HEAD: Atraumatic. Normocephalic. 


EYES: Pupils equal and round. No scleral icterus. No injection or drainage. 


ENT: No nasal bleeding or discharge.  Mucous membranes pink and moist.


NECK: Trachea midline. No JVD. 


CARDIOVASCULAR: Elevated rate and rhythm.  No murmur appreciated.


RESPIRATORY: No accessory muscle use. Clear to auscultation. Breath sounds 

equal bilaterally. 


GASTROINTESTINAL: Abdomen soft, nondistended.  Epigastric tenderness to 

palpation.  Mild no guarding.  No rebound tenderness. hepatic and splenic 

margins not palpable. 


MUSCULOSKELETAL: No obvious deformities. No clubbing.  No cyanosis.  No edema. 


NEUROLOGICAL: Awake and alert. No obvious cranial nerve deficits.  Motor 

grossly within normal limits. Normal speech.





Data


Data


Last Documented VS





Vital Signs








  Date Time  Temp Pulse Resp B/P (MAP) Pulse Ox O2 Delivery O2 Flow Rate FiO2


 


5/29/18 21:43     98 Room Air  


 


5/29/18 21:41 98.2 101 18 143/93 (110)    








Orders





 Orders


Complete Blood Count With Diff (5/29/18 21:39)


Comprehensive Metabolic Panel (5/29/18 21:39)


Lipase (5/29/18 21:39)


Iv Access Insert/Monitor (5/29/18 21:39)


Ecg Monitoring (5/29/18 21:39)


Oximetry (5/29/18 21:39)


Sodium Chlor 0.9% 1000 Ml Inj (Ns 1000 M (5/29/18 21:39)


Sodium Chloride 0.9% Flush (Ns Flush) (5/29/18 21:45)


Ed Discharge Order (5/29/18 22:49)





Labs





Laboratory Tests








Test


  5/29/18


22:00


 


White Blood Count 10.3 TH/MM3 


 


Red Blood Count 5.60 MIL/MM3 


 


Hemoglobin 16.6 GM/DL 


 


Hematocrit 48.9 % 


 


Mean Corpuscular Volume 87.3 FL 


 


Mean Corpuscular Hemoglobin 29.6 PG 


 


Mean Corpuscular Hemoglobin


Concent 33.9 % 


 


 


Red Cell Distribution Width 13.8 % 


 


Platelet Count 348 TH/MM3 


 


Mean Platelet Volume 8.0 FL 


 


Neutrophils (%) (Auto) 68.4 % 


 


Lymphocytes (%) (Auto) 22.0 % 


 


Monocytes (%) (Auto) 8.1 % 


 


Eosinophils (%) (Auto) 0.8 % 


 


Basophils (%) (Auto) 0.7 % 


 


Neutrophils # (Auto) 7.1 TH/MM3 


 


Lymphocytes # (Auto) 2.3 TH/MM3 


 


Monocytes # (Auto) 0.8 TH/MM3 


 


Eosinophils # (Auto) 0.1 TH/MM3 


 


Basophils # (Auto) 0.1 TH/MM3 


 


CBC Comment DIFF FINAL 


 


Differential Comment  


 


Blood Urea Nitrogen 12 MG/DL 


 


Creatinine 1.27 MG/DL 


 


Random Glucose 372 MG/DL 


 


Total Protein 7.7 GM/DL 


 


Albumin 4.4 GM/DL 


 


Calcium Level 9.0 MG/DL 


 


Alkaline Phosphatase 113 U/L 


 


Aspartate Amino Transf


(AST/SGOT) 17 U/L 


 


 


Alanine Aminotransferase


(ALT/SGPT) 19 U/L 


 


 


Total Bilirubin 0.5 MG/DL 


 


Sodium Level 135 MEQ/L 


 


Potassium Level 3.7 MEQ/L 


 


Chloride Level 96 MEQ/L 


 


Carbon Dioxide Level 25.2 MEQ/L 


 


Anion Gap 14 MEQ/L 


 


Estimat Glomerular Filtration


Rate 62 ML/MIN 


 


 


Lipase 349 U/L 











[facility]


Medical Decision Making


Medical Screen Exam Complete:  Yes


Emergency Medical Condition:  Yes


Medical Record Reviewed:  Yes


Differential Diagnosis


Gastritis versus electrolyte abnormality versus peptic ulcer disease versus 

pancreatitis


Narrative Course


43-year-old male presents emergency department for evaluation of nausea, 

epigastric discomfort after he vomited a large piece of what he thought may be 

tissue or the large pieces states that he ate 2 days ago.  Patient appears 

well.  He was able to go to [employer] and eat some of a fish sandwich prior to 

coming home and still feeling nauseous at which time he decided to come in for 

evaluation.  Patient has history of uncontrolled diabetes.  Basic lab work is 

ordered and IV fluids are initiated.








Laboratory Tests








Test


  5/29/18


22:00


 


White Blood Count 10.3 TH/MM3 


 


Red Blood Count 5.60 MIL/MM3 


 


Hemoglobin 16.6 GM/DL 


 


Hematocrit 48.9 % 


 


Mean Corpuscular Volume 87.3 FL 


 


Mean Corpuscular Hemoglobin 29.6 PG 


 


Mean Corpuscular Hemoglobin


Concent 33.9 % 


 


 


Red Cell Distribution Width 13.8 % 


 


Platelet Count 348 TH/MM3 


 


Mean Platelet Volume 8.0 FL 


 


Neutrophils (%) (Auto) 68.4 % 


 


Lymphocytes (%) (Auto) 22.0 % 


 


Monocytes (%) (Auto) 8.1 % 


 


Eosinophils (%) (Auto) 0.8 % 


 


Basophils (%) (Auto) 0.7 % 


 


Neutrophils # (Auto) 7.1 TH/MM3 


 


Lymphocytes # (Auto) 2.3 TH/MM3 


 


Monocytes # (Auto) 0.8 TH/MM3 


 


Eosinophils # (Auto) 0.1 TH/MM3 


 


Basophils # (Auto) 0.1 TH/MM3 


 


CBC Comment DIFF FINAL 


 


Differential Comment  


 


Blood Urea Nitrogen 12 MG/DL 


 


Creatinine 1.27 MG/DL 


 


Random Glucose 372 MG/DL 


 


Total Protein 7.7 GM/DL 


 


Albumin 4.4 GM/DL 


 


Calcium Level 9.0 MG/DL 


 


Alkaline Phosphatase 113 U/L 


 


Aspartate Amino Transf


(AST/SGOT) 17 U/L 


 


 


Alanine Aminotransferase


(ALT/SGPT) 19 U/L 


 


 


Total Bilirubin 0.5 MG/DL 


 


Sodium Level 135 MEQ/L 


 


Potassium Level 3.7 MEQ/L 


 


Chloride Level 96 MEQ/L 


 


Carbon Dioxide Level 25.2 MEQ/L 


 


Anion Gap 14 MEQ/L 


 


Estimat Glomerular Filtration


Rate 62 ML/MIN 


 


 


Lipase 349 U/L 





Lab work is reviewed.  Patient is hyperglycemic however his labs are consistent 

with previous lab work.  Patient will be discharged home to follow-up with 

primary care provider.  He agrees to return immediately with acute worsening of 

symptoms.





Diagnosis





 Primary Impression:  


 Uncontrolled diabetes mellitus


 Qualified Codes:  E11.8 - Type 2 diabetes mellitus with unspecified 

complications; E11.65 - Type 2 diabetes mellitus with hyperglycemia


 Additional Impression:  


 Nausea & vomiting


 Qualified Codes:  R11.2 - Nausea with vomiting, unspecified


Referrals:  


Gastroenterologist





Primary Care Physician





***Additional Instructions:  


It is important that you cut up your food into small pieces prior to eating them


Follow-up with a primary care provider


Seek your gastroenterology evaluation


Return immediately with acute worsening of symptoms


Disposition:  01 DISCHARGE HOME


Condition:  Stable











Daphne Guo ROBINSON May 29, 2018 22:10

## 2018-06-15 ENCOUNTER — HOSPITAL ENCOUNTER (EMERGENCY)
Dept: HOSPITAL 17 - NEPD | Age: 43
Discharge: HOME | End: 2018-06-15
Payer: MEDICAID

## 2018-06-15 VITALS
HEART RATE: 106 BPM | RESPIRATION RATE: 18 BRPM | SYSTOLIC BLOOD PRESSURE: 113 MMHG | OXYGEN SATURATION: 98 % | DIASTOLIC BLOOD PRESSURE: 74 MMHG

## 2018-06-15 VITALS
OXYGEN SATURATION: 97 % | DIASTOLIC BLOOD PRESSURE: 78 MMHG | TEMPERATURE: 98 F | RESPIRATION RATE: 18 BRPM | SYSTOLIC BLOOD PRESSURE: 133 MMHG | HEART RATE: 123 BPM

## 2018-06-15 VITALS — HEIGHT: 69 IN | WEIGHT: 171.96 LBS | BODY MASS INDEX: 25.47 KG/M2

## 2018-06-15 DIAGNOSIS — R10.32: Primary | ICD-10-CM

## 2018-06-15 DIAGNOSIS — Z79.899: ICD-10-CM

## 2018-06-15 DIAGNOSIS — E11.65: ICD-10-CM

## 2018-06-15 DIAGNOSIS — Z79.4: ICD-10-CM

## 2018-06-15 DIAGNOSIS — F31.9: ICD-10-CM

## 2018-06-15 LAB
ALBUMIN SERPL-MCNC: 3.8 GM/DL (ref 3.4–5)
ALP SERPL-CCNC: 107 U/L (ref 45–117)
ALT SERPL-CCNC: 19 U/L (ref 12–78)
AST SERPL-CCNC: 24 U/L (ref 15–37)
BASOPHILS # BLD AUTO: 0 TH/MM3 (ref 0–0.2)
BASOPHILS NFR BLD: 0.3 % (ref 0–2)
BILIRUB SERPL-MCNC: 0.5 MG/DL (ref 0.2–1)
BUN SERPL-MCNC: 13 MG/DL (ref 7–18)
CALCIUM SERPL-MCNC: 9.1 MG/DL (ref 8.5–10.1)
CHLORIDE SERPL-SCNC: 101 MEQ/L (ref 98–107)
COLOR UR: YELLOW
CREAT SERPL-MCNC: 1.19 MG/DL (ref 0.6–1.3)
EOSINOPHIL # BLD: 0.1 TH/MM3 (ref 0–0.4)
EOSINOPHIL NFR BLD: 0.5 % (ref 0–4)
ERYTHROCYTE [DISTWIDTH] IN BLOOD BY AUTOMATED COUNT: 14 % (ref 11.6–17.2)
GFR SERPLBLD BASED ON 1.73 SQ M-ARVRAT: 67 ML/MIN (ref 89–?)
GLUCOSE SERPL-MCNC: 306 MG/DL (ref 74–106)
GLUCOSE UR STRIP-MCNC: >=500 MG/DL
HCO3 BLD-SCNC: 18.1 MEQ/L (ref 21–32)
HCT VFR BLD CALC: 50.9 % (ref 39–51)
HGB BLD-MCNC: 16.7 GM/DL (ref 13–17)
HGB UR QL STRIP: (no result)
HYALINE CASTS #/AREA URNS LPF: 4 /LPF
KETONES UR STRIP-MCNC: (no result) MG/DL
LYMPHOCYTES # BLD AUTO: 2.7 TH/MM3 (ref 1–4.8)
LYMPHOCYTES NFR BLD AUTO: 22.3 % (ref 9–44)
MCH RBC QN AUTO: 29 PG (ref 27–34)
MCHC RBC AUTO-ENTMCNC: 32.9 % (ref 32–36)
MCV RBC AUTO: 88.3 FL (ref 80–100)
MONOCYTE #: 0.8 TH/MM3 (ref 0–0.9)
MONOCYTES NFR BLD: 6.6 % (ref 0–8)
NEUTROPHILS # BLD AUTO: 8.6 TH/MM3 (ref 1.8–7.7)
NEUTROPHILS NFR BLD AUTO: 70.3 % (ref 16–70)
NITRITE UR QL STRIP: (no result)
PLATELET # BLD: 384 TH/MM3 (ref 150–450)
PMV BLD AUTO: 7.8 FL (ref 7–11)
PROT SERPL-MCNC: 7.4 GM/DL (ref 6.4–8.2)
RBC # BLD AUTO: 5.76 MIL/MM3 (ref 4.5–5.9)
SODIUM SERPL-SCNC: 134 MEQ/L (ref 136–145)
SP GR UR STRIP: 1.03 (ref 1–1.03)
URINE LEUKOCYTE ESTERASE: (no result)
WBC # BLD AUTO: 12.2 TH/MM3 (ref 4–11)

## 2018-06-15 PROCEDURE — 96372 THER/PROPH/DIAG INJ SC/IM: CPT

## 2018-06-15 PROCEDURE — 96361 HYDRATE IV INFUSION ADD-ON: CPT

## 2018-06-15 PROCEDURE — 87591 N.GONORRHOEAE DNA AMP PROB: CPT

## 2018-06-15 PROCEDURE — 87491 CHLMYD TRACH DNA AMP PROBE: CPT

## 2018-06-15 PROCEDURE — 81001 URINALYSIS AUTO W/SCOPE: CPT

## 2018-06-15 PROCEDURE — 96374 THER/PROPH/DIAG INJ IV PUSH: CPT

## 2018-06-15 PROCEDURE — 82010 KETONE BODYS QUAN: CPT

## 2018-06-15 PROCEDURE — 80053 COMPREHEN METABOLIC PANEL: CPT

## 2018-06-15 PROCEDURE — 85025 COMPLETE CBC W/AUTO DIFF WBC: CPT

## 2018-06-15 PROCEDURE — 96375 TX/PRO/DX INJ NEW DRUG ADDON: CPT

## 2018-06-15 PROCEDURE — 82550 ASSAY OF CK (CPK): CPT

## 2018-06-15 PROCEDURE — 74176 CT ABD & PELVIS W/O CONTRAST: CPT

## 2018-06-15 PROCEDURE — 99284 EMERGENCY DEPT VISIT MOD MDM: CPT

## 2018-06-15 PROCEDURE — 83690 ASSAY OF LIPASE: CPT

## 2018-06-15 NOTE — RADRPT
EXAM DATE:  6/15/2018 9:06 PM EDT

AGE/SEX:        43 years / Male



INDICATIONS:  Left flank and groin pain.



CLINICAL DATA:  This is the patient's initial encounter. Patient reports that signs and symptoms have
 been present for 1 week and indicates a pain score of 10/10. 

                                                                          

MEDICAL/SURGICAL HISTORY:       Diabetes mellitus type II. .



RADIATION DOSE:  11.42 CTDI (mGy)









COMPAR[place]:      [facility] [place]/O [place]AST, 1/14/2018.  . 





TECHNIQUE:  Multiple contiguous axial images were obtained through the abdomen. Images were obtained 
using multiple row detector helical technique. Using dose reduction techniques, radiation dose was ke
pt as low as reasonably achievable to obtain optimal diagnostic quality images. 



FINDINGS: 

No acute findings in the liver, spleen, adrenals, kidneys or pancreas. No calcified gallstones. There
 is mild constipation. No inguinal hernia. The bladder is distended. No acute bony abnormalities.



CONCLUSION:

1.  No acute findings on abdomen and pelvic CT. Specifically no renal calculi or obstructive uropathy
. Distended bladder.



Electronically signed by: Raudel Junior MD  6/15/2018 9:15 PM EDT